# Patient Record
Sex: FEMALE | Race: WHITE | NOT HISPANIC OR LATINO | Employment: OTHER
[De-identification: names, ages, dates, MRNs, and addresses within clinical notes are randomized per-mention and may not be internally consistent; named-entity substitution may affect disease eponyms.]

---

## 2017-09-17 ENCOUNTER — HEALTH MAINTENANCE LETTER (OUTPATIENT)
Age: 57
End: 2017-09-17

## 2017-12-28 ENCOUNTER — TELEPHONE (OUTPATIENT)
Dept: ONCOLOGY | Facility: CLINIC | Age: 57
End: 2017-12-28

## 2017-12-28 NOTE — TELEPHONE ENCOUNTER
"12/28/2017    Referring Provider: Fabienne Monge MD    Presenting Information:  Cindy Wilson returned my phone call today to discuss her updated genetic testing results. Her blood was originally drawn on 12/18/2015. The CancerNext panel was ordered  from [employer]. This testing was done because of Cindy's personal history of melanoma and family history of colon polyps, breast, ovarian, and pancreatic cancer.     At that time, Cindy was found to have two variants of unknown significance (VUS), one in the APC gene and another in the MUTYH gene. The APC variant is called p.W3266B (also known as c.7717A>G). The MUTYH variant is called p.V234M (also known as c.700G>A). No mutations were found in any of the other tested genes: TODD, BARD1, BMPR1A, BRCA1, BRCA2, BRIP1, CDH1, CDK4, CDKN2A, CHEK2, MLH1, MRE11A, MSH2, MSH6, NBN, NF1, PALB2, PMS2, POLD1, POLE, PTEN, RAD50, RAD51C, RAD51D, SMAD4, SMARCA4, STK11, and TP53.    Updated Result:  Recently [employer] contacted me with a new report. The variant in the APC gene has been reclassified as \"variant, likely benign\".  This means that the APC variant found in Cindy is most likely NOT associated with Familial Adenomatous Polyposis. Cindy should continue with cancer screening based on her personal medical and family history, as previously discussed.     Of note, the MUTYH variant has not yet been reclassified and remains a \"variant of uncertain significance\".    Family History Update:  Cindy shared that her brother with over 100 colon polyps did undergo genetic counseling and genetic testing; he reportedly carries the same APC and MUTYH variants identified in Cindy. I would encourage Cindy's brother to stay in contact with his own genetic counselor, as additional genetic testing for colon polyps may be available in the future.    Cindy also shared that her younger brother was recently diagnosed with what may be prostate cancer; he is not being treated at this time. We " briefly discussed that there are genetic causes of prostate cancer that also cause pancreatic, breast, and ovarian cancer. It is possible that his own cancer history has a separate hereditary explanation from Cindy's history. As such, he is encouraged to discuss with his oncologist the appropriateness of meeting with a genetic counselor to discuss his own genetic testing.     Additional Genetic Testing Options:  We discussed that there is now genetic testing available for a few more genes associated with hereditary melanoma: BAP1, RB1, and MITF. Given her personal and family history of melanoma, Cindy could consider being seen again to discuss this additional testing. Though her personal and family history is not necessarily typical for these genes (i.e. RB1 mutations and retinoblastoma, BAP1 and kidney cancer, mesothelioma), this testing may help inform cancer screening for Cindy and her family members. Cindy explained that she will discuss this testing with her , but will likely be more interested in contacting me in one year to readdress her testing options.    Cindy did have several questions regarding genetic testing for her sons. We reviewed that Cindy's original genetic testing was comprehensive and did not identify any harmful gene mutations. As such, she did not pass on an identifiable harmful mutation in these 32 genes to her children. This means genetic testing is not indicated for them, unless otherwise suggested by their paternal family history. Due to the family history of melanoma, though, her children remain at increased risk for developing melanoma.  According to the American Cancer Society, they are encouraged to speak to their primary care provider about having regular skin exams and safe skin practices (i.e. sunscreen, self skin exams, limited sun exposure, etc.). Cindy confirmed that they are already having regular dermatologic exams. She denied having any other questions at this  time.    Plan:   1) I will send Cindy a copy of the new test report that reflects the change in classification.  2) Cindy will be contacted if the laboratory has any additional information regarding the MUTYH variant in the future.  2) If there are any further questions or concerns, she should not hesitate to contact me at [phone].    Cinthia Sarmiento MS, [facility] [place]  Certified Genetic Counselor  Office: [phone]  Pager: [phone]

## 2017-12-28 NOTE — TELEPHONE ENCOUNTER
12/28/2017    I called Cindy today to discuss the reclassification of her APC variant of uncertain significance, but was unable to reach her. I left a non-detailed voicemail with my name and phone number.    Cinthia Sarmiento MS, [facility] [place]  Certified Genetic Counselor  Office: [phone]  Pager: [phone]

## 2017-12-28 NOTE — Clinical Note
"Please print and send a copy of this letter and the patient's genetic testing report to the patient.    Please enclose test report: \"Send outs misc test [[id number]] (Order [id number])\"  "

## 2017-12-28 NOTE — LETTER
"    Cancer Risk Management  Program [facility]  [facility]  Mailing Address  Cancer Risk Management Program  [facility]  [address]  [place] [address]    New patient appointments  [phone]  January 9, 2018    Cindy Wilson  [address] [address]      Dear Cindy,    It was a pleasure speaking with you over the phone recently regarding your updated genetic testing results. Here is a copy of the progress note summarizing our conversation. If you have any additional questions, please feel free to call.    12/28/2017    Referring Provider: Fabienne Monge MD    Presenting Information:  Cindy Wilson returned my phone call today to discuss her updated genetic testing results. Her blood was originally drawn on 12/18/2015. The CancerNext panel was ordered  from [employer]. This testing was done because of Cindy's personal history of melanoma and family history of colon polyps, breast, ovarian, and pancreatic cancer.     At that time, Cindy was found to have two variants of unknown significance (VUS), one in the APC gene and another in the MUTYH gene. The APC variant is called p.U4999W (also known as c.7717A>G). The MUTYH variant is called p.V234M (also known as c.700G>A). No mutations were found in any of the other tested genes: TODD, BARD1, BMPR1A, BRCA1, BRCA2, BRIP1, CDH1, CDK4, CDKN2A, CHEK2, MLH1, MRE11A, MSH2, MSH6, NBN, NF1, PALB2, PMS2, POLD1, POLE, PTEN, RAD50, RAD51C, RAD51D, SMAD4, SMARCA4, STK11, and TP53.    Updated Result:  Recently [facility] contacted me with a new report. The variant in the APC gene has been reclassified as \"variant, likely benign\".  This means that the APC variant found in Cindy is most likely NOT associated with Familial Adenomatous Polyposis. Cindy should " "continue with cancer screening based on her personal medical and family history, as previously discussed.     Of note, the MUTYH variant has not yet been reclassified and remains a \"variant of uncertain significance\".    Family History Update:  Cindy shared that her brother with over 100 colon polyps did undergo genetic counseling and genetic testing; he reportedly carries the same APC and MUTYH variants identified in Cindy. I would encourage Cindy's brother to stay in contact with his own genetic counselor, as additional genetic testing for colon polyps may be available in the future.    Cindy also shared that her younger brother was recently diagnosed with what may be prostate cancer; he is not being treated at this time. We briefly discussed that there are genetic causes of prostate cancer that also cause pancreatic, breast, and ovarian cancer. It is possible that his own cancer history has a separate hereditary explanation from Cindy's history. As such, he is encouraged to discuss with his oncologist the appropriateness of meeting with a genetic counselor to discuss his own genetic testing.     Additional Genetic Testing Options:  We discussed that there is now genetic testing available for a few more genes associated with hereditary melanoma: BAP1, RB1, and MITF. Given her personal and family history of melanoma, Cindy could consider being seen again to discuss this additional testing. Though her personal and family history is not necessarily typical for these genes (i.e. RB1 mutations and retinoblastoma, BAP1 and kidney cancer, mesothelioma), this testing may help inform cancer screening for Cindy and her family members. Cindy explained that she will discuss this testing with her , but will likely be more interested in contacting me in one year to readdress her testing options.    Cindy did have several questions regarding genetic testing for her sons. We reviewed that Cindy's original genetic " testing was comprehensive and did not identify any harmful gene mutations. As such, she did not pass on an identifiable harmful mutation in these 32 genes to her children. This means genetic testing is not indicated for them, unless otherwise suggested by their paternal family history. Due to the family history of melanoma, though, her children remain at increased risk for developing melanoma.  According to the American Cancer Society, they are encouraged to speak to their primary care provider about having regular skin exams and safe skin practices (i.e. sunscreen, self skin exams, limited sun exposure, etc.). Cindy confirmed that they are already having regular dermatologic exams. She denied having any other questions at this time.    Plan:   1) I will send Cindy a copy of the new test report that reflects the change in classification.  2) Cindy will be contacted if the laboratory has any additional information regarding the MUTYH variant in the future.  2) If there are any further questions or concerns, she should not hesitate to contact me at [phone].    Cinthia Sarmiento MS, [facility] [place]  Certified Genetic Counselor  Office: [phone]  Pager: [phone]

## 2019-11-08 ENCOUNTER — HEALTH MAINTENANCE LETTER (OUTPATIENT)
Age: 59
End: 2019-11-08

## 2020-02-23 ENCOUNTER — HEALTH MAINTENANCE LETTER (OUTPATIENT)
Age: 60
End: 2020-02-23

## 2020-02-28 DIAGNOSIS — R19.7 DIARRHEA OF PRESUMED INFECTIOUS ORIGIN: Primary | ICD-10-CM

## 2020-02-28 RX ORDER — AZITHROMYCIN 500 MG/1
500 TABLET, FILM COATED ORAL DAILY
Qty: 6 TABLET | Refills: 1 | Status: SHIPPED | OUTPATIENT
Start: 2020-02-28 | End: 2022-01-18

## 2020-12-06 ENCOUNTER — HEALTH MAINTENANCE LETTER (OUTPATIENT)
Age: 60
End: 2020-12-06

## 2021-02-25 ENCOUNTER — TELEPHONE (OUTPATIENT)
Dept: ONCOLOGY | Facility: CLINIC | Age: 61
End: 2021-02-25

## 2021-02-25 DIAGNOSIS — Z15.89 MONOALLELIC MUTATION OF MUTYH GENE: ICD-10-CM

## 2021-02-25 NOTE — TELEPHONE ENCOUNTER
"2/25/2021    Referring Provider: Fabienne Monge MD    Presenting Information:  I spoke to Cindy Wilson by phone today to discuss an update to her genetic testing results. She previously met with Carmen Vazquez MS, [facility] [place] on 12/16/2015 to discuss her personal and family history of multiple cancers. Her blood was drawn for the CancerNext panel, offered by [employer].     At that time, Cindy was found to have two variants of unknown significance (VUS), one in the APC gene (p.W8728N) and another in the MUTYH gene (p.V234M). Of note, I previously contacted Cindy on 12/28/2017 regarding the reclassification of the APC variant as \"likely benign\". No mutations were found in any of the other tested genes: TODD, BARD1, BMPR1A, BRCA1, BRCA2, BRIP1, CDH1, CDK4, CDKN2A, CHEK2, MLH1, MRE11A, MSH2, MSH6, NBN, NF1, PALB2, PMS2, POLD1, POLE, PTEN, RAD50, RAD51C, RAD51D, SMAD4, SMARCA4, STK11, and TP53.    Result Reclassification: POSITIVE - CARRIER  Recently [facility] contacted me with a new report. The VUS in the MUTYH gene has been reclassified as \"variant, likely pathogenic\". 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    MUTYH Cancer Risks:   We reviewed that having only one MUTYH mutation means that Cindy is a carrier for MUTYH-Associated Polyposis (MAP). Approximately 1 in 100 (1%) individuals in the general population are carriers of MAP.      Carriers of MAP have a very slightly increased risk of colon cancer (which may be as high as twice the population risk of 4-5%).     Several studies have also suggested that women who carry one MUTYH mutation may be at some increased risk for breast cancer, though additional research is needed to clarify this potential risk.    We discussed that in comparison, individuals with MAP (who carry two MUTYH mutations) can have hundreds to thousands of polyps, but most have fewer than 100. " Individuals with MAP have up to an 80% risk of developing colon cancer by age 70 if not treated. MAP is also associated with an increased risk of duodenal cancer.      Cancer Screening and Prevention:  The following screening is recommended for individuals who have one mutation in the MUTYH gene, per current National Comprehensive Cancer Network (NCCN) guidelines:    Individuals who have a first degree relative (parent, child, sibling) with colon cancer should consider colonoscopy every five years starting at 40 (or 10 years before their relative's age at diagnosis, whichever is first).    It is uncertain if a specialized screening plan is necessary for those individuals who do not have a close family history of colon cancer like Cindy, though. We discussed the importance of keeping in contact with me to determine if guidelines have changed, as this may change how we approach colon screening for her in the future.     There are currently no other specific screening recommendations for other cancers potentially associated with one MUTYH mutation.    In comparison, individuals with MAP are recommended to follow specific colonoscopy and upper endoscopy recommendations, beginning by age 25-30.    Other screening based on family history:    Cindy should continue to follow all dermatologic screening recommendations as made by her medical providers, given her personal history of melanoma.    As previously discussed with Cindy, her family history of a sister and grandmother with pancreatic cancer suggests that she could consider pancreatic cancer screening. This screening typically involves endoscopic ultrasonography (EUS) and/or MRI/magnetic resonance cholangiopancreatography. Cindy explained that she has previously met with a GI specialist to review this screening in more detail.    Cindy clarified that she gathered additional information regarding her brother's colon polyp history, which suggests he did not have as  many polyps as she previously thought. As such, Cindy should continue to follow all colonoscopy recommendations as made by her GI providers based on her updated personal and family history.    Due to Cindy's history of melanoma, her children and siblings remain at some increased risk for developing melanoma. According to the American Cancer Society, they are encouraged to speak to their primary care providers about having regular skin exams and safe skin practices (i.e. sunscreen, self skin exams, limited sun exposure, etc.).    Other population cancer screening options, such as those recommended by the American Cancer Society and NCCN, are also appropriate for Cindy and her family. These screening recommendations may change if there are changes to Cindy's personal and/or family history of cancer. Final screening recommendations should be made by each individual's managing physician.    Of note, the above information is based on our current understanding of Cinyd's genetic findings. Cindy is encouraged to reach out to me regularly and with any pertinent updates to her personal and/or family history of cancer, as our understanding of the genetic findings in her family may change over time.     Implications for Family Members:  We reviewed the autosomal recessive inheritance of MAP. Individuals with MAP have a mutation within both copies of the MUTYH gene (two mutations, one that was inherited from each parent). When both parents are carriers for MAP, they have a 25% chance of having a child who inherited two mutations (affected with MAP), a 50% chance of having a child who inherited one mutation (carrier of MAP), and a 25% chance of having a child who inherited neither mutation (unaffected; not a carrier). These chances apply to each pregnancy.     We discussed that testing for MUTYH mutations in Cindy s  is available, which would be helpful in determining risks for their children. As Cindy's children  are over the age of 18, though, they could consider pursuing their own genetic testing of the MUTYH gene instead. This testing should include full gene sequencing and deletion/duplication analysis of the MUTYH gene to best clarify their risks.     Genetic counseling is also recommended for Cindy s siblings, as they have at least a 50% chance to carry the same MUTYH mutation identified in Cindy. It will likely be recommended that they consider comprehensive testing of the MUTYH gene, though, as it is possible that they could have MAP or carry a mutation in the MUTYH gene that is different than the one identified in Cindy. Other extended relatives may also carry this mutation and Cindy is encouraged to share this information with individuals on both sides of her family. I would be happy to see Cindy s family members for testing.     Additional Testing Considerations:  We then reviewed that though Cindy was found to carry this one MUTYH mutation, there still remains a possibility that she carries another gene mutation or combination of genes and environment that increase her risk for certain cancers. We reviewed the option of updated gene panels covering more genes associated with the cancers in her family, such as the BAP1 gene (melanoma) and the AXIN2, MSH3, NTHL1 genes (colon cancer and polyps). Cindy is encouraged to contact me if she wishes to readdress these new gene panel options.    Also, even though Cindy's genetic testing result was positive for this one mutation, other relatives may carry a different gene mutation associated with hereditary cancer. If her relatives are interested in pursuing genetic testing, they are encouraged to meet with a genetic counselor to discuss their multi-gene testing options. If any of these relatives do pursue genetic testing, Cindy is encouraged to contact me so that we may review the impact of their test results on her.    Plan:  1.  Cindy will be mailed a copy of  "her test results.  2.  I will provide a \"Dear Relative\" letter for Cindy to share with her family members. This will be provided to Cindy through [employer].  3.  She plans to follow up with her medical providers, as needed.  4.  Cindy is encouraged to contact me if she has any questions, if there are any updates to her personal/family history, and if she wishes to readdress her updated genetic testing options.    If Cindy has additional questions, I encouraged her to contact me directly at [phone].     Cinthia Sarmiento MS, [facility]  Licensed Genetic Counselor  Office: [phone]  Pager: [phone]  "

## 2021-03-11 PROBLEM — Z15.89 MONOALLELIC MUTATION OF MUTYH GENE: Status: ACTIVE | Noted: 2021-03-11

## 2021-03-12 NOTE — PATIENT INSTRUCTIONS
2/25/2021    Dear Relative:  The purpose of this letter is to inform you that your relative underwent genetic counseling and genetic testing due to the family history of cancer.    The testing done through [employer] identified one mutation in the MUTYH gene. Specifically, the mutation is called p.V234M (also known as c.700G>A). The accession number linked to your relative's test report is [id number].  Mutations in the MUTYH gene cause a condition called MAP (MUTYH-Associated Polyposis). Carrying just one mutation in MUTYH means you are a carrier of MAP, and have a slightly increased risk for colon cancer. Carrying two mutations in the MUTYH gene means you have MAP.    Individuals with MAP can have hundreds to thousands of colon polyps, although most have fewer than 100 adenomatous polyps. Individuals with MAP have up to an 80% risk of developing colon cancer, as well as an increased risk for duodenal cancer. Screening guidelines are available to help manage these cancer risks.  Carriers of MAP have a very slight increased risk of colon cancer. Of note, approximately 1 in 100 (1%) individuals in the general population are carriers of MAP.  The risk to pass a MUTYH mutation on to children and/or to have a child with MAP depends on if a parent has one or two MUTYH mutation(s). The risks are also different if one or both parents are carriers, not carriers, or have MAP themselves.  I understand that this may be surprising, unexpected, and even scary news. Because this mutation has been identified in your family, you are at risk for having this same mutation (being a carrier for MAP), or having two mutations (having MAP due to this mutation and another mutation). As mentioned earlier, your children may also be at risk.    Genetic testing for mutations in the MUTYH gene can be done to determine your risk. Depending on how you are related to other individuals in the family who have already undergone genetic testing,  more targeted versus comprehensive testing may be appropriate.     Scheduling a genetic counseling appointment does not mean you have to undergo genetic testing. The decision to pursue such testing is a very personal one that is discussed in more detail during the session.  Much of cancer genetic counseling is providing valuable information to individuals who are impacted by genetic information such as this.    If you are interested in scheduling a genetic counseling appointment at [facility], please call [phone] to schedule an appointment. You can also find a genetic counselor close to you or at another health system at [facility]  Sincerely,   Cinthia Sarmiento MS, [facility]  Licensed Genetic Counselor  Office: [phone]  [email or web address]

## 2021-03-17 ENCOUNTER — IMMUNIZATION (OUTPATIENT)
Dept: NURSING | Facility: CLINIC | Age: 61
End: 2021-03-17
Payer: COMMERCIAL

## 2021-03-17 PROCEDURE — 0001A PR COVID VAC PFIZER DIL RECON 30 MCG/0.3 ML IM: CPT

## 2021-03-17 PROCEDURE — 91300 PR COVID VAC PFIZER DIL RECON 30 MCG/0.3 ML IM: CPT

## 2021-04-01 ENCOUNTER — TELEPHONE (OUTPATIENT)
Dept: ONCOLOGY | Facility: CLINIC | Age: 61
End: 2021-04-01

## 2021-04-01 NOTE — TELEPHONE ENCOUNTER
4/1/2021    I called Cindy today, as I was forwarded a message sent by Cindy requesting her updated genetic testing results. Cindy explained that she recalled speaking to me about the results on 4/1/2021, but she never received a copy of the letter explaining the updated results.    I apologized for the inconvenience and explained that she should have received it through the mail. I offered to send her another copy of the letter by e-mail, mail, [employer], etc. She requested that the letter be sent to her by e-mail and she confirmed that the e-mail address in her chart is correct. She also verbalized comfort with the level of security associated with e-mail.    She thanked me for my time and denied having any other questions at this time.    Cinthia Sarmiento MS, [facility]  Licensed Genetic Counselor  Office: [phone]  Pager: [phone]

## 2021-04-07 ENCOUNTER — IMMUNIZATION (OUTPATIENT)
Dept: NURSING | Facility: CLINIC | Age: 61
End: 2021-04-07
Attending: INTERNAL MEDICINE
Payer: COMMERCIAL

## 2021-04-07 PROCEDURE — 91300 PR COVID VAC PFIZER DIL RECON 30 MCG/0.3 ML IM: CPT

## 2021-04-07 PROCEDURE — 0002A PR COVID VAC PFIZER DIL RECON 30 MCG/0.3 ML IM: CPT

## 2021-08-11 DIAGNOSIS — N30.00 ACUTE CYSTITIS WITHOUT HEMATURIA: Primary | ICD-10-CM

## 2021-08-11 RX ORDER — SULFAMETHOXAZOLE/TRIMETHOPRIM 800-160 MG
1 TABLET ORAL 2 TIMES DAILY
Qty: 6 TABLET | Refills: 0 | Status: SHIPPED | OUTPATIENT
Start: 2021-08-11 | End: 2021-08-14

## 2021-09-25 ENCOUNTER — HEALTH MAINTENANCE LETTER (OUTPATIENT)
Age: 61
End: 2021-09-25

## 2021-11-20 ENCOUNTER — HEALTH MAINTENANCE LETTER (OUTPATIENT)
Age: 61
End: 2021-11-20

## 2022-01-03 ENCOUNTER — TRANSCRIBE ORDERS (OUTPATIENT)
Dept: OTHER | Age: 62
End: 2022-01-03
Payer: COMMERCIAL

## 2022-01-03 ENCOUNTER — PATIENT OUTREACH (OUTPATIENT)
Dept: ONCOLOGY | Facility: CLINIC | Age: 62
End: 2022-01-03
Payer: COMMERCIAL

## 2022-01-03 DIAGNOSIS — Z80.0 FAMILY HISTORY OF PANCREATIC CANCER: Primary | ICD-10-CM

## 2022-01-03 NOTE — PROGRESS NOTES
[employer] referral    Referred by: Self    Process discussed with patient: No, unable to reach by phone    Patient understands the [employer] fee is out of pocket: Not at this time, will defer to scheduling on updating her as I am unable to reach her.    Patient willing to proceed with scheduling: Pending

## 2022-01-15 ENCOUNTER — HEALTH MAINTENANCE LETTER (OUTPATIENT)
Age: 62
End: 2022-01-15

## 2022-01-18 ENCOUNTER — ONCOLOGY VISIT (OUTPATIENT)
Dept: ONCOLOGY | Facility: CLINIC | Age: 62
End: 2022-01-18
Attending: CLINICAL NURSE SPECIALIST
Payer: COMMERCIAL

## 2022-01-18 ENCOUNTER — LAB (OUTPATIENT)
Dept: LAB | Facility: CLINIC | Age: 62
End: 2022-01-18
Attending: CLINICAL NURSE SPECIALIST
Payer: COMMERCIAL

## 2022-01-18 VITALS
BODY MASS INDEX: 20.72 KG/M2 | OXYGEN SATURATION: 97 % | DIASTOLIC BLOOD PRESSURE: 66 MMHG | HEART RATE: 66 BPM | HEIGHT: 67 IN | WEIGHT: 132 LBS | TEMPERATURE: 98.5 F | SYSTOLIC BLOOD PRESSURE: 97 MMHG

## 2022-01-18 DIAGNOSIS — Z15.89 MONOALLELIC MUTATION OF MUTYH GENE: ICD-10-CM

## 2022-01-18 DIAGNOSIS — Z85.820 HISTORY OF MELANOMA: Primary | ICD-10-CM

## 2022-01-18 DIAGNOSIS — Z80.41 FAMILY HISTORY OF MALIGNANT NEOPLASM OF OVARY: ICD-10-CM

## 2022-01-18 DIAGNOSIS — Z85.820 HISTORY OF MELANOMA: ICD-10-CM

## 2022-01-18 DIAGNOSIS — Z80.0 FAMILY HISTORY OF PANCREATIC CANCER: ICD-10-CM

## 2022-01-18 PROBLEM — B01.9 VARICELLA: Status: ACTIVE | Noted: 2022-01-18

## 2022-01-18 PROBLEM — J30.1 SEASONAL ALLERGIC RHINITIS DUE TO POLLEN: Status: ACTIVE | Noted: 2019-09-10

## 2022-01-18 PROBLEM — D12.6 SERRATED ADENOMA OF COLON: Status: ACTIVE | Noted: 2021-05-19

## 2022-01-18 PROBLEM — J45.20 MILD INTERMITTENT ASTHMA WITHOUT COMPLICATION: Status: ACTIVE | Noted: 2019-09-10

## 2022-01-18 PROCEDURE — G0463 HOSPITAL OUTPT CLINIC VISIT: HCPCS

## 2022-01-18 PROCEDURE — 99000 SPECIMEN HANDLING OFFICE-LAB: CPT | Performed by: PATHOLOGY

## 2022-01-18 PROCEDURE — 86849 IMMUNOLOGY PROCEDURE: CPT | Mod: 90 | Performed by: PATHOLOGY

## 2022-01-18 PROCEDURE — 99215 OFFICE O/P EST HI 40 MIN: CPT | Performed by: CLINICAL NURSE SPECIALIST

## 2022-01-18 RX ORDER — KETOCONAZOLE 20 MG/ML
SHAMPOO TOPICAL
COMMUNITY
Start: 2021-07-16

## 2022-01-18 RX ORDER — SERTRALINE HYDROCHLORIDE 25 MG/1
TABLET, FILM COATED ORAL
COMMUNITY
Start: 2022-01-15

## 2022-01-18 ASSESSMENT — PAIN SCALES - GENERAL: PAINLEVEL: NO PAIN (0)

## 2022-01-18 ASSESSMENT — MIFFLIN-ST. JEOR: SCORE: 1183.99

## 2022-01-18 NOTE — PROGRESS NOTES
Oncology Risk Management Consultation:  Date on this visit: 2022    Cindy Wilson is self referred to discuss the risks and benefits of the Galleri test to detect cell free DNA from cancer tumors.     Primary Physician: Fabienne Monge MD    History Of Present Illness:  Ms. Wilson is a 62 year old female who presents for a discussion of the risks and benefits of the Galleri test.  She has a family history of ovarian, pancreatic, esophageal and other cancers, as well as a personal history of melanoma.    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    MUTYH mutation p.V234M [employer], reclassified 9/15/2020     Past Surgical History:   Procedure Laterality Date     COLONOSCOPY       ENDOSCOPIC ULTRASOUND UPPER GASTROINTESTINAL TRACT (GI) N/A 2016    Procedure: ENDOSCOPIC ULTRASOUND, ESOPHAGOSCOPY / UPPER GASTROINTESTINAL TRACT (GI);  Surgeon: Miguel A Varela MD;  Location: [facility] OR     GYN SURGERY           ORTHOPEDIC SURGERY      right knee scope       Allergies:  Allergies as of 2022 - Reviewed 2022   Allergen Reaction Noted     Codeine Hives 2016       Current Medications:  Current Outpatient Medications   Medication Sig Dispense Refill     ketoconazole (NIZORAL) 2 % external shampoo        loratadine (CLARITIN) 10 MG tablet Take 10 mg by mouth daily       multivitamin, therapeutic with minerals (THERA-VIT-M) TABS Take 1 tablet by mouth daily       sertraline (ZOLOFT) 25 MG tablet        sertraline (ZOLOFT) 50 MG tablet TAKE 1 TABLET BY MOUTH DAILY. TAKE IN ADDITION TO THE 25 MG DOSE FOR TOTAL DAILY DOSE OF 75 MG PER DAY. GENERIC EQUIVALENT FOR ZOLOFT       VITAMIN D, CHOLECALCIFEROL, PO Take 2,000 Units by mouth daily           Family History:  Family History   Problem Relation Age of Onset     Breast Cancer Mother 76         at 79     Bladder Cancer Father 65      "Pancreatic Cancer Sister 54         at 56; history of pancreatitis     Colon Polyps Brother         more than 100     Pancreatic Cancer Maternal Grandmother 69         at 70     Lung Cancer Maternal Grandfather 70     Esophageal Cancer Paternal Grandmother 95         at 99     Pancreatic Cancer Maternal Cousin 76        possibly other risk factors     Melanoma Paternal Uncle 80     Ovarian Cancer Maternal Aunt 86       Social History:  Social History     Socioeconomic History     Marital status:      Spouse name: Not on file     Number of children: Not on file     Years of education: Not on file     Highest education level: Not on file   Occupational History     Not on file   Tobacco Use     Smoking status: Never Smoker     Smokeless tobacco: Never Used   Substance and Sexual Activity     Alcohol use: Yes     Comment: 1/week     Drug use: No     Sexual activity: Not on file   Other Topics Concern     Not on file   Social History Narrative     Not on file     Social Determinants of Health     Financial Resource Strain: Not on file   Food Insecurity: Not on file   Transportation Needs: Not on file   Physical Activity: Not on file   Stress: Not on file   Social Connections: Not on file   Intimate Partner Violence: Not on file   Housing Stability: Not on file     Physical Exam:  BP 97/66   Pulse 66   Temp 98.5  F (36.9  C) (Oral)   Ht 1.69 m (5' 6.54\")   Wt 59.9 kg (132 lb)   SpO2 97%   BMI 20.96 kg/m    GENERAL: Healthy, alert and no distress  EYES: Eyes grossly normal to inspection.  No discharge or erythema, or obvious scleral/conjunctival abnormalities.  RESP: No audible wheeze, cough, or visible cyanosis.  No visible retractions or increased work of breathing.    SKIN: Visible skin clear. No significant rash, abnormal pigmentation or lesions.  NEURO: Cranial nerves grossly intact.  Mentation and speech appropriate for age.  PSYCH: Mentation appears normal, affect normal/bright, judgement " and insight intact, normal speech and appearance well-groomed.    Laboratory/Imaging Studies  No results found for any visits on 01/18/22.    PROCEDURE NOTE:  The risks and benefits of the procedure were described to the patient.  We discussed that the Galleri test can be included in a routine visit with a simple blood draw. Although not yet FDA approved, the Galleri test is recommended for use in adults with an elevated risk for cancer, such as those aged 50 or older. It is intended to be used in addition to and not replace other cancer screening tests your healthcare provider recommends. The test works by detection of DNA methylation patterns using cell-free DNA (cfDNA) isolated from whole blood. DNA methylation is a natural process used by cells to regulate gene expression. Certain DNA methylation patterns can serve as a signal of cancer and provide information about the origin of the cancer signal.    Not all cancers can be detected by the Galleri test. The Galleri test detects circulating tumor-derived, cell-free DNA (cfDNA) that could indicate the presence of cancer.     The test detects more than 50 types of cancer, many of which do not have routine screening tests.  It has a low single false-positive rate of 0.5%. but it does have higher false negative rates, and the rate varies for different types of cancer.  The Galleri test does not detect all cancers and should be used in addition to guideline-recommended cancer screenings such as mammography, colonoscopy, PSA, or cervical cancer screening.    There are two possible results from the Galleri test:  1. A test result of  Cancer Signal Not Detected  does not rule out cancer.   2. A test result of  Cancer Signal Detected  requires confirmatory diagnostic evaluation by medically established procedures (e.g. imaging) to confirm cancer.     If cancer is not confirmed with further testing, it could mean that cancer is not present or testing was insufficient to  detect cancer, including due to the cancer being located in a different part of the body.      The Galleri test is not intended to replace guideline-recommended cancer screening tests. One of the most pressing unmet needs in early cancer detection is to identify cancers for which there are no existing recommended screening tests. Galleri is designed to detect many cancer types, including those that lack routine screenings.     Using the Galleri test alongside existing cancer screening programs, such as mammograms and colonoscopies, is expected to increase early cancer detection. In addition to detecting many cancers that lack recommended screenings, the Galleri test may also find cancers missed during routine screening, cancers in individuals who were non-adherent to current cancer screening tests, cancers that occurred during screening intervals, or cancers that occurred in individuals who lack access to existing screening tests.    At this visit, we discussed the risks and benefits of Galleri testing and Cindy signed consent for testing.     Cindy's blood was drawn, per Galleri protocol and the test was sent to the [facility] lab for processing.  I will see Cindy in 2 weeks via video visit to go over results.           I spent a total of 44 minutes on the day of the visit. Please see the note for further information on patient assessment and treatment.    Kasandra Clement, YSABEL-CNS, OCN, AGN-BC  Clinical Nurse Specialist  Cancer Risk Management Program  [facility]  phone:  [phone]  fax: [phone]    CC: Fabienne Monge MD

## 2022-01-18 NOTE — NURSING NOTE
"Oncology Rooming Note    January 18, 2022 3:00 PM   Cindy Wilson is a 62 year old female who presents for:    Chief Complaint   Patient presents with     Oncology Clinic Visit     family history of pancreatic cancer     Initial Vitals: BP 97/66   Pulse 66   Temp 98.5  F (36.9  C) (Oral)   Ht 1.69 m (5' 6.54\")   Wt 59.9 kg (132 lb)   SpO2 97%   BMI 20.96 kg/m   Estimated body mass index is 20.96 kg/m  as calculated from the following:    Height as of this encounter: 1.69 m (5' 6.54\").    Weight as of this encounter: 59.9 kg (132 lb). Body surface area is 1.68 meters squared.  No Pain (0) Comment: Data Unavailable   No LMP recorded. Patient is postmenopausal.  Allergies reviewed: Yes  Medications reviewed: Yes    Medications: Medication refills not needed today.  Pharmacy name entered into [employer]:    [id number] [place] [address] [facility] [id number] [place] [address]    Clinical concerns: none       Tatyana Garcia CMA            "

## 2022-01-18 NOTE — LETTER
2022         RE: Cindy Wilson  [address] [facility] [address]        Dear Colleague,    Thank you for referring your patient, Cindy Wilson, to the [facility]. Please see a copy of my visit note below.    Oncology Risk Management Consultation:  Date on this visit: 2022    Cindy Wilson is self referred to discuss the risks and benefits of the Galleri test to detect cell free DNA from cancer tumors.     Primary Physician: Fabienne Monge MD    History Of Present Illness:  Ms. Wilson is a 62 year old female who presents for a discussion of the risks and benefits of the Galleri test.  She has a family history of ovarian, pancreatic, esophageal and other cancers, as well as a personal history of melanoma.    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    MUTYH mutation p.V234M [employer], reclassified 9/15/2020     Past Surgical History:   Procedure Laterality Date     COLONOSCOPY       ENDOSCOPIC ULTRASOUND UPPER GASTROINTESTINAL TRACT (GI) N/A 2016    Procedure: ENDOSCOPIC ULTRASOUND, ESOPHAGOSCOPY / UPPER GASTROINTESTINAL TRACT (GI);  Surgeon: Miguel A Vaerla MD;  Location: [facility] OR     GYN SURGERY           ORTHOPEDIC SURGERY      right knee scope       Allergies:  Allergies as of 2022 - Reviewed 2022   Allergen Reaction Noted     Codeine Hives 2016       Current Medications:  Current Outpatient Medications   Medication Sig Dispense Refill     ketoconazole (NIZORAL) 2 % external shampoo        loratadine (CLARITIN) 10 MG tablet Take 10 mg by mouth daily       multivitamin, therapeutic with minerals (THERA-VIT-M) TABS Take 1 tablet by mouth daily       sertraline (ZOLOFT) 25 MG tablet        sertraline (ZOLOFT) 50 MG tablet TAKE 1 TABLET BY MOUTH DAILY. TAKE IN ADDITION TO THE 25 MG DOSE FOR TOTAL DAILY DOSE OF 75 MG PER DAY. GENERIC  "EQUIVALENT FOR ZOLOFT       VITAMIN D, CHOLECALCIFEROL, PO Take 2,000 Units by mouth daily           Family History:  Family History   Problem Relation Age of Onset     Breast Cancer Mother 76         at 79     Bladder Cancer Father 65     Pancreatic Cancer Sister 54         at 56; history of pancreatitis     Colon Polyps Brother         more than 100     Pancreatic Cancer Maternal Grandmother 69         at 70     Lung Cancer Maternal Grandfather 70     Esophageal Cancer Paternal Grandmother 95         at 99     Pancreatic Cancer Maternal Cousin 76        possibly other risk factors     Melanoma Paternal Uncle 80     Ovarian Cancer Maternal Aunt 86       Social History:  Social History     Socioeconomic History     Marital status:      Spouse name: Not on file     Number of children: Not on file     Years of education: Not on file     Highest education level: Not on file   Occupational History     Not on file   Tobacco Use     Smoking status: Never Smoker     Smokeless tobacco: Never Used   Substance and Sexual Activity     Alcohol use: Yes     Comment: 1/week     Drug use: No     Sexual activity: Not on file   Other Topics Concern     Not on file   Social History Narrative     Not on file     Social Determinants of Health     Financial Resource Strain: Not on file   Food Insecurity: Not on file   Transportation Needs: Not on file   Physical Activity: Not on file   Stress: Not on file   Social Connections: Not on file   Intimate Partner Violence: Not on file   Housing Stability: Not on file     Physical Exam:  BP 97/66   Pulse 66   Temp 98.5  F (36.9  C) (Oral)   Ht 1.69 m (5' 6.54\")   Wt 59.9 kg (132 lb)   SpO2 97%   BMI 20.96 kg/m    GENERAL: Healthy, alert and no distress  EYES: Eyes grossly normal to inspection.  No discharge or erythema, or obvious scleral/conjunctival abnormalities.  RESP: No audible wheeze, cough, or visible cyanosis.  No visible retractions or increased work of " breathing.    SKIN: Visible skin clear. No significant rash, abnormal pigmentation or lesions.  NEURO: Cranial nerves grossly intact.  Mentation and speech appropriate for age.  PSYCH: Mentation appears normal, affect normal/bright, judgement and insight intact, normal speech and appearance well-groomed.    Laboratory/Imaging Studies  No results found for any visits on 01/18/22.    PROCEDURE NOTE:  The risks and benefits of the procedure were described to the patient.  We discussed that the Galleri test can be included in a routine visit with a simple blood draw. Although not yet FDA approved, the Galleri test is recommended for use in adults with an elevated risk for cancer, such as those aged 50 or older. It is intended to be used in addition to and not replace other cancer screening tests your healthcare provider recommends. The test works by detection of DNA methylation patterns using cell-free DNA (cfDNA) isolated from whole blood. DNA methylation is a natural process used by cells to regulate gene expression. Certain DNA methylation patterns can serve as a signal of cancer and provide information about the origin of the cancer signal.    Not all cancers can be detected by the Galleri test. The Galleri test detects circulating tumor-derived, cell-free DNA (cfDNA) that could indicate the presence of cancer.     The test detects more than 50 types of cancer, many of which do not have routine screening tests.  It has a low single false-positive rate of 0.5%. but it does have higher false negative rates, and the rate varies for different types of cancer.  The Galleri test does not detect all cancers and should be used in addition to guideline-recommended cancer screenings such as mammography, colonoscopy, PSA, or cervical cancer screening.    There are two possible results from the Galleri test:  1. A test result of  Cancer Signal Not Detected  does not rule out cancer.   2. A test result of  Cancer Signal  Detected  requires confirmatory diagnostic evaluation by medically established procedures (e.g. imaging) to confirm cancer.     If cancer is not confirmed with further testing, it could mean that cancer is not present or testing was insufficient to detect cancer, including due to the cancer being located in a different part of the body.      The Galleri test is not intended to replace guideline-recommended cancer screening tests. One of the most pressing unmet needs in early cancer detection is to identify cancers for which there are no existing recommended screening tests. Galleri is designed to detect many cancer types, including those that lack routine screenings.     Using the Galleri test alongside existing cancer screening programs, such as mammograms and colonoscopies, is expected to increase early cancer detection. In addition to detecting many cancers that lack recommended screenings, the Galleri test may also find cancers missed during routine screening, cancers in individuals who were non-adherent to current cancer screening tests, cancers that occurred during screening intervals, or cancers that occurred in individuals who lack access to existing screening tests.    At this visit, we discussed the risks and benefits of Galleri testing and Cindy signed consent for testing.     Cindy's blood was drawn, per Galleri protocol and the test was sent to the [facility] lab for processing.  I will see Cindy in 2 weeks via video visit to go over results.           I spent a total of 44 minutes on the day of the visit. Please see the note for further information on patient assessment and treatment.    YSABEL Carrillo-CNS, OCN, AGN-BC  Clinical Nurse Specialist  Cancer Risk Management Program  [facility]  phone:  [phone]  fax: [phone]    CC: Fabienne Monge MD

## 2022-02-04 ENCOUNTER — VIRTUAL VISIT (OUTPATIENT)
Dept: ONCOLOGY | Facility: CLINIC | Age: 62
End: 2022-02-04
Attending: CLINICAL NURSE SPECIALIST
Payer: COMMERCIAL

## 2022-02-04 DIAGNOSIS — Z01.89 ENCOUNTER FOR LABORATORY TEST: Primary | ICD-10-CM

## 2022-02-04 PROBLEM — B01.9 VARICELLA: Status: RESOLVED | Noted: 2022-01-18 | Resolved: 2022-02-04

## 2022-02-04 PROCEDURE — 99214 OFFICE O/P EST MOD 30 MIN: CPT | Mod: 95 | Performed by: CLINICAL NURSE SPECIALIST

## 2022-02-04 NOTE — PROGRESS NOTES
Cindy is a 62 year old who is being evaluated via a billable video visit.      How would you like to obtain your AVS? ShoutOut  If the video visit is dropped, the invitation should be resent by: [phone] via text  Will anyone else be joining your video visit? Rita Agosto    Video Start Time: 1215  Video-Visit Details    Type of service:  Video Visit    Video End Time:1240    Originating Location (pt. Location): Home    Distant Location (provider location):  [facility]     Platform used for Video Visit: Lilli White is a 62 year old who is being evaluated via a billable video visit.      How would you like to obtain your AVS? Mail a copy    Oncology Risk Management Consultation:  Date on this visit: 2022    Cindy Wilson is participating in a billable video visit today to discuss her Galleri test results She is here today with her , Ed.     Primary Physician: Fabienne Monge MD    History Of Present Illness:  Ms. Wilson is a very pleasant, healthy 62 year old female who presents to discuss the results of her Galleri test.  She has a family history of ovarian, pancreatic, esophageal and other cancers, as well as a personal history of melanoma.    Genetic testin2015. Cancer Next panel from [facility]. (This testing was done because of Cindy's personal history of melanoma and family history of colon polyps, breast, ovarian, and pancreatic cancer.)  Cindy was found to be negative for pathogenic mutations in all genes tested but was found to have two variants of unknown significance (VUS), one in the APC gene and another in the MUTYH gene. The APC variant is called p.E8553Y (also known as c.7717A>G). The MUTYH variant is called p.V234M (also known as c.700G>A).     No mutations were found in any of the other tested genes: TODD, BARD1, BMPR1A, BRCA1, BRCA2, BRIP1, CDH1, CDK4, CDKN2A, CHEK2, MLH1, MRE11A, MSH2, MSH6, NBN, NF1, PALB2, PMS2, POLD1, POLE,  "PTEN, RAD50, RAD51C, RAD51D, SMAD4, SMARCA4, STK11, and TP53.    2017- Updated Result: APC Gene:  The variant in the APC gene was reclassified as \"variant, likely benign\".  This means that the APC variant found in Cindy is most likely NOT associated with Familial Adenomatous Polyposis.     2021- Updated Result: MUTYH Gene  The testing done through [facility] in  was upgraded to note that the previously identified \"variant of unknown significance\" is now reclassified to \"variant, likely pathogenic.\"  The variant identified one mutation in the MUTYH gene. Specifically, the mutation is called p.V234M (also known as c.700G>A).   Mutations in the MUTYH gene cause a condition called MAP (MUTYH-Associated Polyposis). Carrying just one mutation in MUTYH means you are a carrier of MAP, and have a slightly increased risk for colon cancer. Carrying two mutations in the MUTYH gene means you have MAP.  Cindy is a carrier only.    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    MUTYH mutation p.V234M [facility], reclassified 9/15/2020     Past Surgical History:   Procedure Laterality Date     COLONOSCOPY       ENDOSCOPIC ULTRASOUND UPPER GASTROINTESTINAL TRACT (GI) N/A 2016    Procedure: ENDOSCOPIC ULTRASOUND, ESOPHAGOSCOPY / UPPER GASTROINTESTINAL TRACT (GI);  Surgeon: Miguel A Varela MD;  Location: [facility] OR     GYN SURGERY           ORTHOPEDIC SURGERY      right knee scope       Allergies:  Allergies as of 2022 - Reviewed 2022   Allergen Reaction Noted     Codeine Hives 2016       Current Medications:  Current Outpatient Medications   Medication Sig Dispense Refill     ketoconazole (NIZORAL) 2 % external shampoo        loratadine (CLARITIN) 10 MG tablet Take 10 mg by mouth daily       multivitamin, therapeutic with minerals (THERA-VIT-M) TABS Take 1 tablet by mouth daily       " sertraline (ZOLOFT) 25 MG tablet        sertraline (ZOLOFT) 50 MG tablet TAKE 1 TABLET BY MOUTH DAILY. TAKE IN ADDITION TO THE 25 MG DOSE FOR TOTAL DAILY DOSE OF 75 MG PER DAY. GENERIC EQUIVALENT FOR ZOLOFT       VITAMIN D, CHOLECALCIFEROL, PO Take 2,000 Units by mouth daily           Family History:  Family History   Problem Relation Age of Onset     Breast Cancer Mother 76         at 79     Bladder Cancer Father 65     Pancreatic Cancer Sister 54         at 56; history of pancreatitis     Breast Cancer Sister 40        DCIS     Colon Polyps Brother         more than 100     Pancreatic Cancer Maternal Grandmother 69         at 70     Lung Cancer Maternal Grandfather 70     Esophageal Cancer Paternal Grandmother 95         at 99     Ovarian Cancer Maternal Aunt 86     Melanoma Paternal Uncle 80     Pancreatic Cancer Maternal Cousin 76        possibly other risk factors       Social History:  Social History     Socioeconomic History     Marital status:      Spouse name: Ed     Number of children: 2     Years of education: Not on file     Highest education level: Not on file   Occupational History     Employer: RETIRED     Comment: [employer]   Tobacco Use     Smoking status: Never Smoker     Smokeless tobacco: Never Used   Substance and Sexual Activity     Alcohol use: Yes     Comment: 1/week     Drug use: No     Sexual activity: Yes     Partners: Male   Other Topics Concern     Not on file   Social History Narrative     Not on file     Social Determinants of Health     Financial Resource Strain: Not on file   Food Insecurity: Not on file   Transportation Needs: Not on file   Physical Activity: Not on file   Stress: Not on file   Social Connections: Not on file   Intimate Partner Violence: Not on file   Housing Stability: Not on file       Physical Exam:  There were no vitals taken for this visit.  GENERAL: Healthy, alert and no distress  EYES: Eyes grossly normal to inspection.  No  discharge or erythema, or obvious scleral/conjunctival abnormalities.  RESP: No audible wheeze, cough, or visible cyanosis.  No visible retractions or increased work of breathing.    SKIN: Visible skin clear. No significant rash, abnormal pigmentation or lesions.  NEURO: Cranial nerves grossly intact.  Mentation and speech appropriate for age.  PSYCH: Mentation appears normal, affect normal/bright, judgement and insight intact, normal speech and appearance well-groomed.    Laboratory/Imaging Studies  No results found for any visits on 02/04/22.    Results:  The Galleri test looks for signals present in the blood that could indicate the presence of cancer.  When the cancer signal is detected, the test predicts the origin of the cancer signal with high accuracy.  However the Galleri test cannot detect all cancers and not all cancers can be detected in the blood.  The Galleri test does not detect specific genetic mutations and does not determine in individuals risk of developing cancer in the future.    Cindy's genetic test result is negative, noting a cancer signal was not detected.  This means that no cancer signal was found at this time.  It does not rule out the presence of cancer or predict whether she will develop cancer in the future.  Not all cancers can be detected in the blood or by the Galleri test.    If a cancer signal would have been found, the test would have analyzed patterns to identify where in the body of the signal may have come from.    Next steps:  1.  Continue with any routine cancer screening tests your healthcare provider recommends, such as mammograms and colonoscopies.  2.  Do not ignore cancer signs or symptoms if they occur, as this could lead to delayed diagnosis.    3.  Discuss  the appropriate timing of cancer screenings with your health care provider to help increase the chance of earlier detection.  4. If you choose to repeat the Galleri test, the appropriate timeline would be to  repeat it in one year.    We discussed that Cindy has not had follow up with Dr. Varela for continued pancreatic screening after her 2016 endoscopic ultrasound.  I will help her get scheduled for follow up with him, and she will likely have an MRCP as her next follow up.      She asked about further genetic testing for any new genes linked to the cancers in her family; Cinthia Sarmiento, [facility], confirmed that there are a few genes associated with colon polyps and melanoma that she may want to have genetic testing for and she will be happy to see her for that. The Next Generation Sequencing methodology is the same.    Cinyd will contact me if she chooses to re-test with [facility] and will connect with Cinthia if she would like to discuss further genetic testing.       I spent a total of 30 minutes on the day of the visit. Please see the note for further information on patient assessment and treatment.    Kasandra Clement, YSABEL-CNS, OCN, AGN-BC  Clinical Nurse Specialist  Cancer Risk Management Program  [facility]  phone:  [phone]  fax: [phone]    CC; Fabienne Monge MD

## 2022-02-04 NOTE — LETTER
2022         RE: Cindy Wilson  [address] [address]        Dear Cindy,     Please see a copy of my visit note below.    Cindy is a 62 year old who is being evaluated via a billable video visit.      How would you like to obtain your AVS? MyChart  If the video visit is dropped, the invitation should be resent by: [phone] via text  Will anyone else be joining your video visit? Rita    Kika Agosto    Video Start Time: 1215  Video-Visit Details    Type of service:  Video Visit    Video End Time:1240    Originating Location (pt. Location): Home    Distant Location (provider location):  [facility]     Platform used for Video Visit: Lilli White is a 62 year old who is being evaluated via a billable video visit.      How would you like to obtain your AVS? Mail a copy    Oncology Risk Management Consultation:  Date on this visit: 2022    Cindy Wilson is participating in a billable video visit today to discuss her Galleri test results She is here today with her , Ed.     Primary Physician: Fabienne Monge MD    History Of Present Illness:  Ms. Wilson is a very pleasant, healthy 62 year old female who presents to discuss the results of her Galleri test.  She has a family history of ovarian, pancreatic, esophageal and other cancers, as well as a personal history of melanoma.    Genetic testin2015. Cancer Next panel from [facility]. (This testing was done because of Cindy's personal history of melanoma and family history of colon polyps, breast, ovarian, and pancreatic cancer.)  Cindy was found to be negative for pathogenic mutations in all genes tested but was found to have two variants of unknown significance (VUS), one in the APC gene and another in the MUTYH gene. The APC variant is called p.V4999C (also known as c.7717A>G). The MUTYH variant is called p.V234M (also known as c.700G>A).     No mutations were found in any of the other  "tested genes: TODD, BARD1, BMPR1A, BRCA1, BRCA2, BRIP1, CDH1, CDK4, CDKN2A, CHEK2, MLH1, MRE11A, MSH2, MSH6, NBN, NF1, PALB2, PMS2, POLD1, POLE, PTEN, RAD50, RAD51C, RAD51D, SMAD4, SMARCA4, STK11, and TP53.    2017- Updated Result: APC Gene:  The variant in the APC gene was reclassified as \"variant, likely benign\".  This means that the APC variant found in Cindy is most likely NOT associated with Familial Adenomatous Polyposis.     2021- Updated Result: MUTYH Gene  The testing done through [facility] in  was upgraded to note that the previously identified \"variant of unknown significance\" is now reclassified to \"variant, likely pathogenic.\"  The variant identified one mutation in the MUTYH gene. Specifically, the mutation is called p.V234M (also known as c.700G>A).   Mutations in the MUTYH gene cause a condition called MAP (MUTYH-Associated Polyposis). Carrying just one mutation in MUTYH means you are a carrier of MAP, and have a slightly increased risk for colon cancer. Carrying two mutations in the MUTYH gene means you have MAP.  Cindy is a carrier only.    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    MUTYH mutation p.V234M [facility], reclassified 9/15/2020     Past Surgical History:   Procedure Laterality Date     COLONOSCOPY       ENDOSCOPIC ULTRASOUND UPPER GASTROINTESTINAL TRACT (GI) N/A 2016    Procedure: ENDOSCOPIC ULTRASOUND, ESOPHAGOSCOPY / UPPER GASTROINTESTINAL TRACT (GI);  Surgeon: Miguel A Varela MD;  Location: [facility] OR     GYN SURGERY           ORTHOPEDIC SURGERY      right knee scope       Allergies:  Allergies as of 2022 - Reviewed 2022   Allergen Reaction Noted     Codeine Hives 2016       Current Medications:  Current Outpatient Medications   Medication Sig Dispense Refill     ketoconazole (NIZORAL) 2 % external shampoo        loratadine " (CLARITIN) 10 MG tablet Take 10 mg by mouth daily       multivitamin, therapeutic with minerals (THERA-VIT-M) TABS Take 1 tablet by mouth daily       sertraline (ZOLOFT) 25 MG tablet        sertraline (ZOLOFT) 50 MG tablet TAKE 1 TABLET BY MOUTH DAILY. TAKE IN ADDITION TO THE 25 MG DOSE FOR TOTAL DAILY DOSE OF 75 MG PER DAY. GENERIC EQUIVALENT FOR ZOLOFT       VITAMIN D, CHOLECALCIFEROL, PO Take 2,000 Units by mouth daily           Family History:  Family History   Problem Relation Age of Onset     Breast Cancer Mother 76         at 79     Bladder Cancer Father 65     Pancreatic Cancer Sister 54         at 56; history of pancreatitis     Breast Cancer Sister 40        DCIS     Colon Polyps Brother         more than 100     Pancreatic Cancer Maternal Grandmother 69         at 70     Lung Cancer Maternal Grandfather 70     Esophageal Cancer Paternal Grandmother 95         at 99     Ovarian Cancer Maternal Aunt 86     Melanoma Paternal Uncle 80     Pancreatic Cancer Maternal Cousin 76        possibly other risk factors       Social History:  Social History     Socioeconomic History     Marital status:      Spouse name: Ed     Number of children: 2     Years of education: Not on file     Highest education level: Not on file   Occupational History     Employer: RETIRED     Comment: [employer]   Tobacco Use     Smoking status: Never Smoker     Smokeless tobacco: Never Used   Substance and Sexual Activity     Alcohol use: Yes     Comment: 1/week     Drug use: No     Sexual activity: Yes     Partners: Male   Other Topics Concern     Not on file   Social History Narrative     Not on file     Social Determinants of Health     Financial Resource Strain: Not on file   Food Insecurity: Not on file   Transportation Needs: Not on file   Physical Activity: Not on file   Stress: Not on file   Social Connections: Not on file   Intimate Partner Violence: Not on file   Housing Stability: Not on file        Physical Exam:  There were no vitals taken for this visit.  GENERAL: Healthy, alert and no distress  EYES: Eyes grossly normal to inspection.  No discharge or erythema, or obvious scleral/conjunctival abnormalities.  RESP: No audible wheeze, cough, or visible cyanosis.  No visible retractions or increased work of breathing.    SKIN: Visible skin clear. No significant rash, abnormal pigmentation or lesions.  NEURO: Cranial nerves grossly intact.  Mentation and speech appropriate for age.  PSYCH: Mentation appears normal, affect normal/bright, judgement and insight intact, normal speech and appearance well-groomed.    Laboratory/Imaging Studies  No results found for any visits on 02/04/22.    Results:  The Galleri test looks for signals present in the blood that could indicate the presence of cancer.  When the cancer signal is detected, the test predicts the origin of the cancer signal with high accuracy.  However the Galleri test cannot detect all cancers and not all cancers can be detected in the blood.  The Galleri test does not detect specific genetic mutations and does not determine in individuals risk of developing cancer in the future.    Cindy's genetic test result is negative, noting a cancer signal was not detected.  This means that no cancer signal was found at this time.  It does not rule out the presence of cancer or predict whether she will develop cancer in the future.  Not all cancers can be detected in the blood or by the Galleri test.    If a cancer signal would have been found, the test would have analyzed patterns to identify where in the body of the signal may have come from.    Next steps:  1.  Continue with any routine cancer screening tests your healthcare provider recommends, such as mammograms and colonoscopies.  2.  Do not ignore cancer signs or symptoms if they occur, as this could lead to delayed diagnosis.    3.  Discuss  the appropriate timing of cancer screenings with your health  care provider to help increase the chance of earlier detection.  4. If you choose to repeat the Galleri test, the appropriate timeline would be to repeat it in one year.    We discussed that Cindy has not had follow up with Dr. Varela for continued pancreatic screening after her 2016 endoscopic ultrasound.  I will help her get scheduled for follow up with him, and she will likely have an MRCP as her next follow up.      She asked about further genetic testing for any new genes linked to the cancers in her family; Cinthia Sarmiento, [facility], confirmed that there are a few genes associated with colon polyps and melanoma that she may want to have genetic testing for and she will be happy to see her for that. The Next Generation Sequencing methodology is the same.    Cindy will contact me if she chooses to re-test with Galleri and will connect with Cinthia if she would like to discuss further genetic testing.       I spent a total of 30 minutes on the day of the visit. Please see the note for further information on patient assessment and treatment.    Kasandra Clement, APRN-CNS, OCN, AGN-BC  Clinical Nurse Specialist  Cancer Risk Management Program  [facility]  phone:  [phone]  fax: [phone]    CC; Fabienne Monge MD

## 2022-02-08 ENCOUNTER — TELEPHONE (OUTPATIENT)
Dept: GASTROENTEROLOGY | Facility: CLINIC | Age: 62
End: 2022-02-08
Payer: COMMERCIAL

## 2022-02-08 NOTE — TELEPHONE ENCOUNTER
From: Kasandra Clement APRN CNS   Sent: 2/4/2022   1:57 PM CST   To: Patrizia Núñez, RN, Miguel Art Wolf Varela MD   Subject: follow up from EUS in 2016                       Hi Dr. Varela,     ...She saw you for an EUS in 2016 as she has a very strong family history of pancreatic cancer.  She has been lost to followup; I think you wanted to see her in 3 years (2019) for a consult and possibly an MRCP.     Called Pt to get her scheduled. No answer. Left VM for Pt to call back.    Ayush Daley LPN

## 2022-03-25 LAB — SCANNED LAB RESULT: NORMAL

## 2022-05-10 ENCOUNTER — TELEPHONE (OUTPATIENT)
Dept: GASTROENTEROLOGY | Facility: CLINIC | Age: 62
End: 2022-05-10
Payer: COMMERCIAL

## 2022-05-10 NOTE — TELEPHONE ENCOUNTER
LVM to alecia pt appt on 5/12. Pt cancellled due to covid and would like to to alecia please alecia next avail

## 2022-05-17 ENCOUNTER — TELEPHONE (OUTPATIENT)
Dept: GASTROENTEROLOGY | Facility: CLINIC | Age: 62
End: 2022-05-17
Payer: COMMERCIAL

## 2022-11-29 DIAGNOSIS — Z80.0 FAMILY HISTORY OF PANCREATIC CANCER: Primary | ICD-10-CM

## 2023-04-22 ENCOUNTER — HEALTH MAINTENANCE LETTER (OUTPATIENT)
Age: 63
End: 2023-04-22

## 2023-06-02 ENCOUNTER — DOCUMENTATION ONLY (OUTPATIENT)
Dept: GASTROENTEROLOGY | Facility: CLINIC | Age: 63
End: 2023-06-02
Payer: COMMERCIAL

## 2023-06-02 NOTE — PROGRESS NOTES
Called PT and confirmed Appointment.    Called to remind patient of their upcoming appointment with our GI clinic, on 06/08/23 at 9:30 AM with Dr. Miguel A Varela. This appointment is scheduled as an in-person appt. Please arrive 15 minutes early to check in for your appointment. , if your appointment is virtual (video or telephone) you need to be in [place] for the visit. To reschedule or cancel patient to call [phone].      SK

## 2023-06-08 ENCOUNTER — OFFICE VISIT (OUTPATIENT)
Dept: GASTROENTEROLOGY | Facility: CLINIC | Age: 63
End: 2023-06-08
Attending: CLINICAL NURSE SPECIALIST
Payer: COMMERCIAL

## 2023-06-08 VITALS
DIASTOLIC BLOOD PRESSURE: 68 MMHG | HEART RATE: 72 BPM | SYSTOLIC BLOOD PRESSURE: 105 MMHG | WEIGHT: 130.4 LBS | BODY MASS INDEX: 20.47 KG/M2 | HEIGHT: 67 IN | OXYGEN SATURATION: 98 %

## 2023-06-08 DIAGNOSIS — Z80.0 FAMILY HISTORY OF PANCREATIC CANCER: ICD-10-CM

## 2023-06-08 PROCEDURE — 99205 OFFICE O/P NEW HI 60 MIN: CPT | Performed by: INTERNAL MEDICINE

## 2023-06-08 RX ORDER — DESOXIMETASONE 2.5 MG/G
CREAM TOPICAL 2 TIMES DAILY
COMMUNITY

## 2023-06-08 RX ORDER — ALBUTEROL SULFATE 90 UG/1
2 AEROSOL, METERED RESPIRATORY (INHALATION) EVERY 6 HOURS PRN
COMMUNITY

## 2023-06-08 RX ORDER — TRIAMCINOLONE ACETONIDE 1 MG/G
CREAM TOPICAL 2 TIMES DAILY
COMMUNITY

## 2023-06-08 ASSESSMENT — PAIN SCALES - GENERAL: PAINLEVEL: NO PAIN (0)

## 2023-06-08 NOTE — LETTER
6/8/2023         RE: Cindy Wilson  [address] [facility] [address]        Dear Colleague,    Thank you for referring your patient, Cindy Wilson, to the [facility] [place]. Please see a copy of my visit note below.        [facility]    CC/Referring Physician:  Kasandra Clement  Question for Consultation:  Family history of pancreatic cancer    Assessment and Plan:  Ms Wilson is a 64yo woman with a strong family history of pancreatic cancer who has not had image screening/surveillance since 2006. We reviewed our options for evaluation, including both EUS and MRI/MRCP, as these are equally sensitive. As MRI is noninvasive we will organize these yearly until such time as a finding requires sampling, at which point we will reflex an EUS for fine needle biopsy. We reviewed the technique of EUS including need for deep sedation and rare risks such bleeding, infection and pancreatitis. We also discussed that MRI may demonstrate unrelated cystic lesions which themselves deserve surveillance as they may represent premalignant lesions such as side branched intraductal papillary mucinous neoplasms.    In terms of her carrier state of MUYTH, data suggests she has an increased risk for colon cancer, however unlike [place], adenomas would likely take near a decade to advance to malignancy. Rather this syndrome is complicated by quantity of polyps and she had only two in 2021.    She has no modifiable risk factors for pancreatic disease.    Plan  Yearly MRI/MRCP with IV contrast  Repeat colonoscopy in 2026    [facility] as clinical course dictates    It was a pleasure to participate in the care of this patient; please contact us with any further questions.  A total of at least 60 minutes was spent in evaluation of this patient today, >50% of which was discussion and counseling regarding the above delineated issues.      Miguel A Varela MD PhD FACG  LUANA DAY  Professor of Medicine, Surgery and Pediatrics  Interventional and Therapeutic Endoscopy  Chief, Division of Gastroenterology and Hepatology and Nutrition  GI Service Line Medical Director    [facility] [address] [place], [place] [address]    New Consultations  [phone]  Procedure Scheduling  [phone]  Clinical Nurse Coordinator [phone]  Clinical Fax   [phone]  Administrative   [phone]  Administrative Fax  [phone]    -------------------------------------------------------------------------------------------------------------------  History of Presentation:   Ms Wilson (Dr Wilson's wife) is a 63 year old woman with a strong family history of pancreatic cancer (one first degree, sister at 54, and three maternal second degree in their late 60s/[id number]) who presents to discuss her risks of pancreatic cancer and if surveillance is appropriate. She has undergone a few different genetic tests, in part due to her family history of pancreatic cancer, but also because of other cancers including ovarian, skin, and breast. Her brother had many polyps and is a carrier of MUTYH associated adenomatous polyposis. She and her son tested positive as a carrier.    She had an EUS in 2016 which was unremarkable as was her MRI that year as well. Her last colonoscopy was in May 2021, 2 3-4mm polyps.    She does not smoke tobacco or abuse alcohol and her BMI is approximately 20.    Review of systems:  A twelve point review was performed and was otherwise negative beyond what is mentioned in the history above.    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    MUTYH mutation p.V234M [place], reclassified 9/15/2020     Previous surgeries:  Past Surgical History:   Procedure Laterality Date    COLONOSCOPY      ENDOSCOPIC ULTRASOUND UPPER  GASTROINTESTINAL TRACT (GI) N/A 2016    Procedure: ENDOSCOPIC ULTRASOUND, ESOPHAGOSCOPY / UPPER GASTROINTESTINAL TRACT (GI);  Surgeon: Miguel A Varela MD;  Location: [facility] OR    GYN SURGERY          ORTHOPEDIC SURGERY      right knee scope     Current mediations:  Current Outpatient Medications   Medication    albuterol (PROAIR HFA/PROVENTIL HFA/VENTOLIN HFA) 108 (90 Base) MCG/ACT inhaler    Ca Phosphate-Cholecalciferol (CALCIUM 500 + D3) 250-12.5 MG-MCG CHEW    desoximetasone (TOPICORT) 0.25 % external cream    ketoconazole (NIZORAL) 2 % external shampoo    loratadine (CLARITIN) 10 MG tablet    sertraline (ZOLOFT) 25 MG tablet    sertraline (ZOLOFT) 50 MG tablet    triamcinolone (KENALOG) 0.1 % external cream    VITAMIN D, CHOLECALCIFEROL, PO    multivitamin, therapeutic with minerals (THERA-VIT-M) TABS     No current facility-administered medications for this visit.     Medications reviewed with patient today, see Medication List/Assessment for details.  No other NSAID/anticoagulation reported by patient.  No other OTC/herbal/supplements reported by patient.    Social history:  Social History     Socioeconomic History    Marital status:      Spouse name: Ed    Number of children: 2    Years of education: Not on file    Highest education level: Not on file   Occupational History     Employer: RETIRED     Comment: [employer]   Tobacco Use    Smoking status: Never    Smokeless tobacco: Never   Vaping Use    Vaping status: Not on file   Substance and Sexual Activity    Alcohol use: Yes     Comment: 1/week    Drug use: No    Sexual activity: Yes     Partners: Male   Other Topics Concern    Not on file   Social History Narrative    Not on file     Social Determinants of Health     Financial Resource Strain: Not on file   Food Insecurity: Not on file   Transportation Needs: Not on file   Physical Activity: Not on file   Stress: Not on file   Social Connections: Not on file   Intimate Partner  "Violence: Not on file   Housing Stability: Not on file       Family history:  Family History   Problem Relation Age of Onset    Breast Cancer Mother 76         at 79    Bladder Cancer Father 65    Pancreatic Cancer Sister 54         at 56; history of pancreatitis    Breast Cancer Sister 40        DCIS    Colon Polyps Brother         more than 100    Pancreatic Cancer Maternal Grandmother 69         at 70    Lung Cancer Maternal Grandfather 70    Esophageal Cancer Paternal Grandmother 95         at 99    Ovarian Cancer Maternal Aunt 86    Melanoma Paternal Uncle 80    Pancreatic Cancer Maternal Cousin 76        possibly other risk factors     No colon/panc/other GI CA, no other HNPCC-related [place].  No IBD/celiac, no AI/liver disease.    PHYSICAL EXAMINATION:  Vitals reviewed, AFVSS   Wt   Wt Readings from Last 2 Encounters:   22 59.9 kg (132 lb)   16 59.7 kg (131 lb 9.8 oz)      Gen: nontoxic, aaox3, cooperative, pleasant, not dyspneic/diaphoretic  HEENT: neck supple, normal op w/o ulcer/exudate, anicteric  Resp/CV unremarkable without significant finding  Abd: benign, soft, nondistended, intact bs, no hepatosplenomegaly, nontender, no peritoneal signs  Ext: no c/c/e  Skin: warm, perfused, no jaundice  Neuro: grossly intact    Pertinent outside studies:        Chief Complaint   Patient presents with    New Patient       Vitals:    23 0931   BP: 105/68   BP Location: Left arm   Patient Position: Sitting   Cuff Size: Adult Regular   Pulse: 72   SpO2: 98%   Weight: 59.1 kg (130 lb 6.4 oz)   Height: 1.702 m (5' 7\")       Body mass index is 20.42 kg/m .    Natalia Carpenter        Again, thank you for allowing me to participate in the care of your patient.        Sincerely,        Miguel A Varela MD    "

## 2023-06-08 NOTE — PATIENT INSTRUCTIONS
Follow up:    Dr. Varela has outlined the following steps after your recent clinic visit:       Plan  Yearly MRI/MRCP with IV contrast. Please call [phone] to schedule the imaging.     Repeat colonoscopy in 2026     [facility] as clinical course dictates      Please call with any questions or concerns regarding your clinic visit today.    It is a pleasure being involved in your health care.    Contacts post-consultation depending on your need:    Schedule Clinic Appointments            [phone] # 1   M-F 7:30 - 5 pm    Patrizia Núñez, RN Care Coordinator (Dr. Varela/Dr. Howard)  [phone]    Nadira Tomlinson, RN Care Coordinator (Dr. Douglas)   [phone]    Pita Drummond, RN Care Coordinator (Dr. Pardo/Dr. Kwok)  [phone]      For urgent/emergent questions after business hours, you may reach the on-call GI Fellow by contacting the [facility]  at [phone].    How do I schedule labs, imaging studies, or procedures that were ordered in clinic today?     Labs: To schedule lab appointment at the [facility], use my chart or call [phone]. If you have a [place] lab closer to home where you are regularly seen you can give them a call.     Procedures: If a colonoscopy, upper endoscopy, breath test, esophageal manometry, or pH impedence was ordered today, our endoscopy team will call you to schedule this. If you have not heard from our endoscopy team within a week, please call [phone] to schedule.     Imaging Studies: If you were scheduled for a CT scan, X-ray, MRI, ultrasound, HIDA scan or other imaging study, please call [phone] to have this scheduled.     Referral: If a referral to another specialty was ordered, expect a phone call or follow instructions above. If you have not heard from anyone regarding your referral in a week, please call our clinic to check the status.     How to I schedule a follow-up visit?  If you did not schedule a follow-up  visit today, please call [phone] to schedule a follow-up office visit.

## 2023-06-08 NOTE — PROGRESS NOTES
"Chief Complaint   Patient presents with     New Patient       Vitals:    06/08/23 0931   BP: 105/68   BP Location: Left arm   Patient Position: Sitting   Cuff Size: Adult Regular   Pulse: 72   SpO2: 98%   Weight: 59.1 kg (130 lb 6.4 oz)   Height: 1.702 m (5' 7\")       Body mass index is 20.42 kg/m .    Natalia Carpenter"

## 2023-06-08 NOTE — PROGRESS NOTES
[facility]    CC/Referring Physician:  Kasandra Clement  Question for Consultation:  Family history of pancreatic cancer    Assessment and Plan:  Ms Wilson is a 62yo woman with a strong family history of pancreatic cancer who has not had image screening/surveillance since 2006. We reviewed our options for evaluation, including both EUS and MRI/MRCP, as these are equally sensitive. As MRI is noninvasive we will organize these yearly until such time as a finding requires sampling, at which point we will reflex an EUS for fine needle biopsy. We reviewed the technique of EUS including need for deep sedation and rare risks such bleeding, infection and pancreatitis. We also discussed that MRI may demonstrate unrelated cystic lesions which themselves deserve surveillance as they may represent premalignant lesions such as side branched intraductal papillary mucinous neoplasms.    In terms of her carrier state of MUYTH, data suggests she has an increased risk for colon cancer, however unlike [place], adenomas would likely take near a decade to advance to malignancy. Rather this syndrome is complicated by quantity of polyps and she had only two in 2021.    She has no modifiable risk factors for pancreatic disease.    Plan  Yearly MRI/MRCP with IV contrast  Repeat colonoscopy in 2026    [facility] as clinical course dictates    It was a pleasure to participate in the care of this patient; please contact us with any further questions.  A total of at least 60 minutes was spent in evaluation of this patient today, >50% of which was discussion and counseling regarding the above delineated issues.      Miguel A Varela MD PhD FACG LUANA DAY  Professor of Medicine, Surgery and Pediatrics  Interventional and Therapeutic Endoscopy  Chief, Division of Gastroenterology and Hepatology and Nutrition  GI Service Line Medical Director    [facility] [address]  [place], [place] [address]    New Consultations  [phone]  Procedure Scheduling  [phone]  Clinical Nurse Coordinator [phone]  Clinical Fax   [phone]  Administrative   [phone]  Administrative Fax  [phone]    -------------------------------------------------------------------------------------------------------------------  History of Presentation:   Ms Wilson (Dr Wilson's wife) is a 63 year old woman with a strong family history of pancreatic cancer (one first degree, sister at 54, and three maternal second degree in their late 60s/[id number]) who presents to discuss her risks of pancreatic cancer and if surveillance is appropriate. She has undergone a few different genetic tests, in part due to her family history of pancreatic cancer, but also because of other cancers including ovarian, skin, and breast. Her brother had many polyps and is a carrier of MUTYH associated adenomatous polyposis. She and her son tested positive as a carrier.    She had an EUS in  which was unremarkable as was her MRI that year as well. Her last colonoscopy was in May 2021, 2 3-4mm polyps.    She does not smoke tobacco or abuse alcohol and her BMI is approximately 20.    Review of systems:  A twelve point review was performed and was otherwise negative beyond what is mentioned in the history above.    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    MUTYH mutation p.V234M [place], reclassified 9/15/2020     Previous surgeries:  Past Surgical History:   Procedure Laterality Date     COLONOSCOPY       ENDOSCOPIC ULTRASOUND UPPER GASTROINTESTINAL TRACT (GI) N/A 2016    Procedure: ENDOSCOPIC ULTRASOUND, ESOPHAGOSCOPY / UPPER GASTROINTESTINAL TRACT (GI);  Surgeon: Miguel A Varela MD;  Location: [facility] OR     GYN SURGERY           ORTHOPEDIC SURGERY      right knee scope     Current  mediations:  Current Outpatient Medications   Medication     albuterol (PROAIR HFA/PROVENTIL HFA/VENTOLIN HFA) 108 (90 Base) MCG/ACT inhaler     Ca Phosphate-Cholecalciferol (CALCIUM 500 + D3) 250-12.5 MG-MCG CHEW     desoximetasone (TOPICORT) 0.25 % external cream     ketoconazole (NIZORAL) 2 % external shampoo     loratadine (CLARITIN) 10 MG tablet     sertraline (ZOLOFT) 25 MG tablet     sertraline (ZOLOFT) 50 MG tablet     triamcinolone (KENALOG) 0.1 % external cream     VITAMIN D, CHOLECALCIFEROL, PO     multivitamin, therapeutic with minerals (THERA-VIT-M) TABS     No current facility-administered medications for this visit.     Medications reviewed with patient today, see Medication List/Assessment for details.  No other NSAID/anticoagulation reported by patient.  No other OTC/herbal/supplements reported by patient.    Social history:  Social History     Socioeconomic History     Marital status:      Spouse name: Ed     Number of children: 2     Years of education: Not on file     Highest education level: Not on file   Occupational History     Employer: RETIRED     Comment: [employer]   Tobacco Use     Smoking status: Never     Smokeless tobacco: Never   Vaping Use     Vaping status: Not on file   Substance and Sexual Activity     Alcohol use: Yes     Comment: 1/week     Drug use: No     Sexual activity: Yes     Partners: Male   Other Topics Concern     Not on file   Social History Narrative     Not on file     Social Determinants of Health     Financial Resource Strain: Not on file   Food Insecurity: Not on file   Transportation Needs: Not on file   Physical Activity: Not on file   Stress: Not on file   Social Connections: Not on file   Intimate Partner Violence: Not on file   Housing Stability: Not on file       Family history:  Family History   Problem Relation Age of Onset     Breast Cancer Mother 76         at 79     Bladder Cancer Father 65     Pancreatic Cancer Sister 54         at  56; history of pancreatitis     Breast Cancer Sister 40        DCIS     Colon Polyps Brother         more than 100     Pancreatic Cancer Maternal Grandmother 69         at 70     Lung Cancer Maternal Grandfather 70     Esophageal Cancer Paternal Grandmother 95         at 99     Ovarian Cancer Maternal Aunt 86     Melanoma Paternal Uncle 80     Pancreatic Cancer Maternal Cousin 76        possibly other risk factors     No colon/panc/other GI CA, no other HNPCC-related [place].  No IBD/celiac, no AI/liver disease.    PHYSICAL EXAMINATION:  Vitals reviewed, AFVSS   Wt   Wt Readings from Last 2 Encounters:   22 59.9 kg (132 lb)   16 59.7 kg (131 lb 9.8 oz)      Gen: nontoxic, aaox3, cooperative, pleasant, not dyspneic/diaphoretic  HEENT: neck supple, normal op w/o ulcer/exudate, anicteric  Resp/CV unremarkable without significant finding  Abd: benign, soft, nondistended, intact bs, no hepatosplenomegaly, nontender, no peritoneal signs  Ext: no c/c/e  Skin: warm, perfused, no jaundice  Neuro: grossly intact    Pertinent outside studies:

## 2023-06-26 ENCOUNTER — HOSPITAL ENCOUNTER (OUTPATIENT)
Dept: MRI IMAGING | Facility: CLINIC | Age: 63
Discharge: HOME | End: 2023-06-26
Attending: INTERNAL MEDICINE | Admitting: INTERNAL MEDICINE
Payer: COMMERCIAL

## 2023-06-26 DIAGNOSIS — Z80.0 FAMILY HISTORY OF PANCREATIC CANCER: ICD-10-CM

## 2023-06-26 PROCEDURE — 255N000002 HC RX 255 OP 636: Mod: JZ | Performed by: INTERNAL MEDICINE

## 2023-06-26 PROCEDURE — 74183 MRI ABD W/O CNTR FLWD CNTR: CPT

## 2023-06-26 PROCEDURE — A9585 GADOBUTROL INJECTION: HCPCS | Mod: JZ | Performed by: INTERNAL MEDICINE

## 2023-06-26 PROCEDURE — 74183 MRI ABD W/O CNTR FLWD CNTR: CPT | Mod: 26 | Performed by: RADIOLOGY

## 2023-06-26 RX ORDER — GADOBUTROL 604.72 MG/ML
6.5 INJECTION INTRAVENOUS ONCE
Status: COMPLETED | OUTPATIENT
Start: 2023-06-26 | End: 2023-06-26

## 2023-06-26 RX ADMIN — GADOBUTROL 6.5 ML: 604.72 INJECTION INTRAVENOUS at 09:23

## 2023-06-27 DIAGNOSIS — Z80.0 FAMILY HISTORY OF PANCREATIC CANCER: Primary | ICD-10-CM

## 2024-05-03 NOTE — PROGRESS NOTES
"Virtual Visit Details    Type of service:  Video Visit     Originating Location (pt. Location): Home  Distant Location (provider location):  On-site  Platform used for Video Visit: [facility]      Oncology Risk Management Consultation:  Date on this visit: 2024    Cindy Wilson returns to the cancer risk management program to discuss the risks and benefits of the Galleri test to detect cell free DNA from cancer tumors.      Primary Physician: Fabienne Monge MD     History Of Present Illness:  Ms. Wilson is a very pleasant, healthy 64 year old female who presents for a discussion of the risks and benefits of the Galleri test. She has a family history of ovarian, pancreatic, esophageal and other cancers, as well as a personal history of melanoma diagnosed in .     Genetic testin2015: Cancer Next panel from [facility]. (This testing was done because of Cindy's personal history of melanoma and family history of colon polyps, breast, ovarian, and pancreatic cancer.)  Cindy was found to be negative for pathogenic mutations in all genes tested but was found to have two variants of unknown significance (VUS), one in the APC gene and another in the MUTYH gene. The APC variant is called p.G9983U (also known as c.7717A>G). The MUTYH variant is called p.V234M (also known as c.700G>A).      No mutations were found in any of the other tested genes: TODD, BARD1, BMPR1A, BRCA1, BRCA2, BRIP1, CDH1, CDK4, CDKN2A, CHEK2, MLH1, MRE11A, MSH2, MSH6, NBN, NF1, PALB2, PMS2, POLD1, POLE, PTEN, RAD50, RAD51C, RAD51D, SMAD4, SMARCA4, STK11, and TP53.     2017- Updated Result: APC Gene:  The variant in the APC gene was reclassified as \"variant, likely benign\".  This means that the APC variant found in Cindy is most likely NOT associated with Familial Adenomatous Polyposis.      2021- Updated Result: MUTYH Gene  The testing done through [facility] in  was upgraded to note that the previously identified " "\"variant of unknown significance\" is now reclassified to \"variant, likely pathogenic.\"  The variant identified one mutation in the MUTYH gene. Specifically, the mutation is called p.V234M (also known as c.700G>A). Mutations in the MUTYH gene cause a condition called MAP (MUTYH-Associated Polyposis). Carrying just one mutation in MUTYH means you are a carrier of MAP, and have a slightly increased risk for colon cancer. Carrying two mutations in the MUTYH gene means you have MAP.  Cindy is a carrier only.      Pertinent Screening History:  2021: Colonoscopy (Henrik Pace MD at [facility]): Two, 2-4mm polyps removed in the transverse colon. Repeat colonoscopy in five years.   FINAL DIAGNOSIS    A.  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    MUTYH mutation p.V234M [facility], reclassified 9/15/2020     Past Surgical History:   Procedure Laterality Date    COLONOSCOPY      ENDOSCOPIC ULTRASOUND UPPER GASTROINTESTINAL TRACT (GI) N/A 2016    Procedure: ENDOSCOPIC ULTRASOUND, ESOPHAGOSCOPY / UPPER GASTROINTESTINAL TRACT (GI);  Surgeon: Miguel A Varela MD;  Location: [facility] OR    GYN SURGERY          ORTHOPEDIC SURGERY      right knee scope       Allergies:  Allergies as of 2024 - Reviewed 2024   Allergen Reaction Noted    Codeine Hives 2016       Current Medications:  Current Outpatient Medications   Medication Sig Dispense Refill    albuterol (PROAIR HFA/PROVENTIL HFA/VENTOLIN HFA) 108 (90 Base) MCG/ACT inhaler Inhale 2 puffs into the lungs every 6 hours as needed for shortness of breath, wheezing or cough      Ca Phosphate-Cholecalciferol (CALCIUM 500 + D3) 250-12.5 MG-MCG CHEW       desoximetasone (TOPICORT) 0.25 % external cream Apply topically 2 times " daily      ketoconazole (NIZORAL) 2 % external shampoo       loratadine (CLARITIN) 10 MG tablet Take 10 mg by mouth daily      multivitamin, therapeutic with minerals (THERA-VIT-M) TABS Take 1 tablet by mouth daily (Patient not taking: Reported on 2023)      sertraline (ZOLOFT) 25 MG tablet       sertraline (ZOLOFT) 50 MG tablet TAKE 1 TABLET BY MOUTH DAILY. TAKE IN ADDITION TO THE 25 MG DOSE FOR TOTAL DAILY DOSE OF 75 MG PER DAY. GENERIC EQUIVALENT FOR ZOLOFT      triamcinolone (KENALOG) 0.1 % external cream Apply topically 2 times daily      VITAMIN D, CHOLECALCIFEROL, PO Take 2,000 Units by mouth daily           Family History:  Family History   Problem Relation Age of Onset    Breast Cancer Mother 76         at 79    Bladder Cancer Father 65         at age 92 of bladder cancer    Pancreatic Cancer Sister 54         at 56; history of pancreatitis    Breast Cancer Sister 40        DCIS    Colon Polyps Brother         more than 100    Prostate Cancer Brother 60    Pancreatic Cancer Maternal Grandmother 69         at 70    Lung Cancer Maternal Grandfather 70    Esophageal Cancer Paternal Grandmother 95         at 99    Ovarian Cancer Maternal Aunt 86    Melanoma Paternal Uncle 80    Pancreatic Cancer Maternal Cousin 70        possibly other risk factors       Social History:  Social History     Socioeconomic History    Marital status:      Spouse name: Ed    Number of children: 2    Years of education: Not on file    Highest education level: Not on file   Occupational History     Employer: RETIRED     Comment: [employer]   Tobacco Use    Smoking status: Never     Passive exposure: Never (per pt)    Smokeless tobacco: Never   Substance and Sexual Activity    Alcohol use: Yes     Comment: 1/week    Drug use: No    Sexual activity: Yes     Partners: Male   Other Topics Concern    Not on file   Social History Narrative    Not on file     Social Determinants of Health     Financial  "Resource Strain: Not At Risk (4/24/2024)    [facility]    Financial Resource Strain     Is it hard for you to pay for the very basics like food, housing, medical care or heating?: No   Food Insecurity: Not At Risk (4/24/2024)    [facility]    Food Insecurity     Does your food run out before you have the money to buy more?: No   Transportation Needs: Not At Risk (4/24/2024)    [facility]    Transportation Needs     Does a lack of transportation keep you from your medical appointments or from getting your medications?: No   Physical Activity: Not on file   Stress: Not on file   Social Connections: Not on file   Interpersonal Safety: Not on file   Housing Stability: Not on file       Physical Exam:  Ht 1.702 m (5' 7\")   Wt 57.6 kg (126 lb 14.4 oz)   BMI 19.88 kg/m    GENERAL: alert and no distress  EYES: Eyes grossly normal to inspection.  No discharge or erythema, or obvious scleral/conjunctival abnormalities.  RESP: No audible wheeze, cough, or visible cyanosis.    SKIN: Visible skin clear. No significant rash, abnormal pigmentation or lesions.  NEURO: Cranial nerves grossly intact.  Mentation and speech appropriate for age.  PSYCH: Appropriate affect, tone, and pace of words    Laboratory/Imaging Studies  No results found for any visits on 05/08/24.    Results:    Cindy returns to the cancer risk management program to discuss cancer screening recommendations and the Galleri test. We reviewed her family history. She updated me that her dad passed away from bladder cancer last summer. We also updated her family history with her brother's prostate cancer. She is up to date on recommended general population cancer screenings. She had a mammogram in January, which was negative. Her last colonoscopy was on 5/17/2021, which showed one sessile serrated adenoma. It was recommended that this be repeated in five years. She follows with Renata Guerrero MD, " for routine dermatological exams. Her next visit scheduled for next month.     We reviewed that the Galleri test can be included in a routine visit with a simple blood draw. Although not yet FDA approved, the Galleri test is recommended for use in adults with an elevated risk for cancer, such as those aged 50 or older. It is intended to be used in addition to and not replace other cancer screening tests your healthcare provider recommends. The test works by detection of DNA methylation patterns using cell-free DNA (cfDNA) isolated from whole blood. DNA methylation is a natural process used by cells to regulate gene expression. Certain DNA methylation patterns can serve as a signal of cancer and provide information about the origin of the cancer signal. Not all cancers can be detected by the Galleri test.     The test detects more than 50 types of cancer, many of which do not have routine screening tests.  It has a low single false-positive rate of 0.5%. but it does have higher false negative rates, and the rate varies for different types of cancer. The Galleri test does not detect all cancers and should be used in addition to guideline-recommended cancer screenings such as mammography, colonoscopy, PSA, or cervical cancer screening.    We discussed that the NHS-Galleri trial is currently underway in the [place]. This is a randomized, controlled clinical trial being conducted alongside standard cancer screening with diagnostic follow-up in the [facility]  clinical practice setting.  About 140,000 participants have been enrolled at 150 mobile clinics around [place].  The trial will focus on evaluating an unprecedented number of people who do not have cancer symptoms and who represent diverse socio-economic and ethnic populations. The three year trial (5547-1041) will explore the expected benefits of detecting new cancers at an early stage when treatment is generally more successful. By screening participants annually  over three years, we will be able to explore how the Galleri test could be used alongside existing NHS screening programs.       I spent a total of 42 minutes on the day of the visit. Please see the note for further information on patient assessment and treatment.     Mary Burger, KEESHA, APRN, [facility]-BC  Clinical Nurse Specialist  Cancer Risk Management Program  [facility]    Cc:  Fabienne Monge MD

## 2024-05-08 ENCOUNTER — VIRTUAL VISIT (OUTPATIENT)
Dept: ONCOLOGY | Facility: CLINIC | Age: 64
End: 2024-05-08
Payer: COMMERCIAL

## 2024-05-08 VITALS — WEIGHT: 126.9 LBS | BODY MASS INDEX: 19.92 KG/M2 | HEIGHT: 67 IN

## 2024-05-08 DIAGNOSIS — Z12.5 SCREENING FOR PROSTATE CANCER: Primary | ICD-10-CM

## 2024-05-08 DIAGNOSIS — Z85.820 HISTORY OF MELANOMA: ICD-10-CM

## 2024-05-08 DIAGNOSIS — Z80.0 FAMILY HISTORY OF PANCREATIC CANCER: ICD-10-CM

## 2024-05-08 DIAGNOSIS — Z80.3 FAMILY HISTORY OF MALIGNANT NEOPLASM OF BREAST: ICD-10-CM

## 2024-05-08 DIAGNOSIS — Z80.52 FAMILY HISTORY OF BLADDER CANCER: ICD-10-CM

## 2024-05-08 PROCEDURE — 99215 OFFICE O/P EST HI 40 MIN: CPT | Mod: 95

## 2024-05-08 ASSESSMENT — PAIN SCALES - GENERAL: PAINLEVEL: NO PAIN (0)

## 2024-05-08 NOTE — NURSING NOTE
Is the patient currently in the state of [place]? YES    Visit mode:VIDEO    If the visit is dropped, the patient can be reconnected by: VIDEO VISIT: Text to cell phone:   Telephone Information:   Mobile [phone]       Will anyone else be joining the visit? NO  (If patient encounters technical issues they should call [phone]    How would you like to obtain your AVS? MyChart    Are changes needed to the allergy or medication list? Pt stated no med changes    Are refills needed on medications prescribed by this physician? NO    Reason for visit: RECHECK    No other vitals to report per pt    Lara GROVERF

## 2024-05-08 NOTE — LETTER
"    2024         RE: Cindy Wilson  [address] [address]        Dear Colleague,    Thank you for referring your patient, Cindy Wilson, to the [facility]. Please see a copy of my visit note below.    Virtual Visit Details    Type of service:  Video Visit     Originating Location (pt. Location): Home  Distant Location (provider location):  On-site  Platform used for Video Visit: [facility]      Oncology Risk Management Consultation:  Date on this visit: 2024    Cindy Wilson returns to the cancer risk management program to discuss the risks and benefits of the Galleri test to detect cell free DNA from cancer tumors.      Primary Physician: Fabienne Monge MD     History Of Present Illness:  Ms. Wilson is a very pleasant, healthy 64 year old female who presents for a discussion of the risks and benefits of the Galleri test. She has a family history of ovarian, pancreatic, esophageal and other cancers, as well as a personal history of melanoma diagnosed in .     Genetic testin2015: Cancer Next panel from [facility]. (This testing was done because of Cindy's personal history of melanoma and family history of colon polyps, breast, ovarian, and pancreatic cancer.)  Cindy was found to be negative for pathogenic mutations in all genes tested but was found to have two variants of unknown significance (VUS), one in the APC gene and another in the MUTYH gene. The APC variant is called p.F0524Y (also known as c.7717A>G). The MUTYH variant is called p.V234M (also known as c.700G>A).      No mutations were found in any of the other tested genes: TODD, BARD1, BMPR1A, BRCA1, BRCA2, BRIP1, CDH1, CDK4, CDKN2A, CHEK2, MLH1, MRE11A, MSH2, MSH6, NBN, NF1, PALB2, PMS2, POLD1, POLE, PTEN, RAD50, RAD51C, RAD51D, SMAD4, SMARCA4, STK11, and TP53.     2017- Updated Result: APC Gene:  The variant in the APC gene was reclassified as \"variant, likely benign\".  This means " "that the APC variant found in Cindy is most likely NOT associated with Familial Adenomatous Polyposis.      2021- Updated Result: MUTYH Gene  The testing done through [facility] in  was upgraded to note that the previously identified \"variant of unknown significance\" is now reclassified to \"variant, likely pathogenic.\"  The variant identified one mutation in the MUTYH gene. Specifically, the mutation is called p.V234M (also known as c.700G>A). Mutations in the MUTYH gene cause a condition called MAP (MUTYH-Associated Polyposis). Carrying just one mutation in MUTYH means you are a carrier of MAP, and have a slightly increased risk for colon cancer. Carrying two mutations in the MUTYH gene means you have MAP.  Cindy is a carrier only.      Pertinent Screening History:  2021: Colonoscopy (Henrik Pace MD at [facility]): Two, 2-4mm polyps removed in the transverse colon. Repeat colonoscopy in five years.   FINAL DIAGNOSIS    A.  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    MUTYH mutation p.V234M [facility], reclassified 9/15/2020     Past Surgical History:   Procedure Laterality Date    COLONOSCOPY      ENDOSCOPIC ULTRASOUND UPPER GASTROINTESTINAL TRACT (GI) N/A 2016    Procedure: ENDOSCOPIC ULTRASOUND, ESOPHAGOSCOPY / UPPER GASTROINTESTINAL TRACT (GI);  Surgeon: Miguel A Varela MD;  Location: [facility] OR    GYN SURGERY          ORTHOPEDIC SURGERY      right knee scope       Allergies:  Allergies as of 2024 - Reviewed 2024   Allergen Reaction Noted    Codeine Hives 2016       Current Medications:  Current Outpatient Medications   Medication Sig Dispense Refill    albuterol (PROAIR HFA/PROVENTIL HFA/VENTOLIN HFA) 108 (90 Base) MCG/ACT inhaler " Inhale 2 puffs into the lungs every 6 hours as needed for shortness of breath, wheezing or cough      Ca Phosphate-Cholecalciferol (CALCIUM 500 + D3) 250-12.5 MG-MCG CHEW       desoximetasone (TOPICORT) 0.25 % external cream Apply topically 2 times daily      ketoconazole (NIZORAL) 2 % external shampoo       loratadine (CLARITIN) 10 MG tablet Take 10 mg by mouth daily      multivitamin, therapeutic with minerals (THERA-VIT-M) TABS Take 1 tablet by mouth daily (Patient not taking: Reported on 2023)      sertraline (ZOLOFT) 25 MG tablet       sertraline (ZOLOFT) 50 MG tablet TAKE 1 TABLET BY MOUTH DAILY. TAKE IN ADDITION TO THE 25 MG DOSE FOR TOTAL DAILY DOSE OF 75 MG PER DAY. GENERIC EQUIVALENT FOR ZOLOFT      triamcinolone (KENALOG) 0.1 % external cream Apply topically 2 times daily      VITAMIN D, CHOLECALCIFEROL, PO Take 2,000 Units by mouth daily           Family History:  Family History   Problem Relation Age of Onset    Breast Cancer Mother 76         at 79    Bladder Cancer Father 65         at age 92 of bladder cancer    Pancreatic Cancer Sister 54         at 56; history of pancreatitis    Breast Cancer Sister 40        DCIS    Colon Polyps Brother         more than 100    Prostate Cancer Brother 60    Pancreatic Cancer Maternal Grandmother 69         at 70    Lung Cancer Maternal Grandfather 70    Esophageal Cancer Paternal Grandmother 95         at 99    Ovarian Cancer Maternal Aunt 86    Melanoma Paternal Uncle 80    Pancreatic Cancer Maternal Cousin 70        possibly other risk factors       Social History:  Social History     Socioeconomic History    Marital status:      Spouse name: Ed    Number of children: 2    Years of education: Not on file    Highest education level: Not on file   Occupational History     Employer: RETIRED     Comment: [employer]   Tobacco Use    Smoking status: Never     Passive exposure: Never (per pt)    Smokeless tobacco: Never   Substance and  "Sexual Activity    Alcohol use: Yes     Comment: 1/week    Drug use: No    Sexual activity: Yes     Partners: Male   Other Topics Concern    Not on file   Social History Narrative    Not on file     Social Determinants of Health     Financial Resource Strain: Not At Risk (4/24/2024)    Received from [facility]    Financial Resource Strain     Is it hard for you to pay for the very basics like food, housing, medical care or heating?: No   Food Insecurity: Not At Risk (4/24/2024)    Received from [facility]    Food Insecurity     Does your food run out before you have the money to buy more?: No   Transportation Needs: Not At Risk (4/24/2024)    Received from [facility]    Transportation Needs     Does a lack of transportation keep you from your medical appointments or from getting your medications?: No   Physical Activity: Not on file   Stress: Not on file   Social Connections: Not on file   Interpersonal Safety: Not on file   Housing Stability: Not on file       Physical Exam:  Ht 1.702 m (5' 7\")   Wt 57.6 kg (126 lb 14.4 oz)   BMI 19.88 kg/m    GENERAL: alert and no distress  EYES: Eyes grossly normal to inspection.  No discharge or erythema, or obvious scleral/conjunctival abnormalities.  RESP: No audible wheeze, cough, or visible cyanosis.    SKIN: Visible skin clear. No significant rash, abnormal pigmentation or lesions.  NEURO: Cranial nerves grossly intact.  Mentation and speech appropriate for age.  PSYCH: Appropriate affect, tone, and pace of words    Laboratory/Imaging Studies  No results found for any visits on 05/08/24.    Results:    Cindy returns to the cancer risk management program to discuss cancer screening recommendations and the Galleri test. We reviewed her family history. She updated me that her dad passed away from bladder cancer last summer. We also updated her family history with her brother's prostate cancer. She is up to date on recommended general population cancer " screenings. She had a mammogram in January, which was negative. Her last colonoscopy was on 5/17/2021, which showed one sessile serrated adenoma. It was recommended that this be repeated in five years. She follows with Renata Guerrero MD, for routine dermatological exams. Her next visit scheduled for next month.     We reviewed that the Galleri test can be included in a routine visit with a simple blood draw. Although not yet FDA approved, the Galleri test is recommended for use in adults with an elevated risk for cancer, such as those aged 50 or older. It is intended to be used in addition to and not replace other cancer screening tests your healthcare provider recommends. The test works by detection of DNA methylation patterns using cell-free DNA (cfDNA) isolated from whole blood. DNA methylation is a natural process used by cells to regulate gene expression. Certain DNA methylation patterns can serve as a signal of cancer and provide information about the origin of the cancer signal. Not all cancers can be detected by the Galleri test.     The test detects more than 50 types of cancer, many of which do not have routine screening tests.  It has a low single false-positive rate of 0.5%. but it does have higher false negative rates, and the rate varies for different types of cancer. The Galleri test does not detect all cancers and should be used in addition to guideline-recommended cancer screenings such as mammography, colonoscopy, PSA, or cervical cancer screening.    We discussed that the NHS-Galleri trial is currently underway in the [place]. This is a randomized, controlled clinical trial being conducted alongside standard cancer screening with diagnostic follow-up in the [facility]  clinical practice setting.  About 140,000 participants have been enrolled at 150 mobile clinics around [place].  The trial will focus on evaluating an unprecedented number of people who do not have cancer symptoms and  who represent diverse socio-economic and ethnic populations. The three year trial (8899-0708) will explore the expected benefits of detecting new cancers at an early stage when treatment is generally more successful. By screening participants annually over three years, we will be able to explore how the Galleri test could be used alongside existing NHS screening programs.       I spent a total of 42 minutes on the day of the visit. Please see the note for further information on patient assessment and treatment.     Mary Burger, KEESHA, APRN, AGCNS-BC  Clinical Nurse Specialist  Cancer Risk Management Program  [facility] [place]    Cc:  Fabienne Monge MD

## 2024-05-14 ENCOUNTER — PATIENT OUTREACH (OUTPATIENT)
Dept: ONCOLOGY | Facility: CLINIC | Age: 64
End: 2024-05-14

## 2024-05-14 ENCOUNTER — LAB (OUTPATIENT)
Dept: LAB | Facility: CLINIC | Age: 64
End: 2024-05-14
Payer: COMMERCIAL

## 2024-05-14 DIAGNOSIS — Z12.5 SCREENING FOR PROSTATE CANCER: ICD-10-CM

## 2024-05-14 DIAGNOSIS — Z85.820 HISTORY OF MELANOMA: ICD-10-CM

## 2024-05-14 DIAGNOSIS — Z80.52 FAMILY HISTORY OF BLADDER CANCER: ICD-10-CM

## 2024-05-14 DIAGNOSIS — Z80.3 FAMILY HISTORY OF MALIGNANT NEOPLASM OF BREAST: ICD-10-CM

## 2024-05-14 DIAGNOSIS — Z80.0 FAMILY HISTORY OF PANCREATIC CANCER: ICD-10-CM

## 2024-05-14 PROCEDURE — 36415 COLL VENOUS BLD VENIPUNCTURE: CPT | Performed by: PATHOLOGY

## 2024-05-14 PROCEDURE — 86849 IMMUNOLOGY PROCEDURE: CPT

## 2024-05-14 NOTE — PROGRESS NOTES
Writer received referral to Cancer Risk Management/Genetic Counseling.    Referred for:   Cindy wants to revisit her genetic testing results to see if there are any additional recommendations. PLease schedule as a return visit with Cinthia Sarmiento.   This qualifies for NEW visit as it has been > 3 years since last conversation with genetic counseling.    Cinthia has agreed to see in an open return spot.    Referred By    Provider Department Location Phone   Mary Burger APRN CNP [facility] [phone]       Referral reviewed for appropriate plan, and sent to New Patient Scheduling ([phone]) for completion.    Karyn Rice, RN, BSN  Oncology New Patient Nurse Navigator   [facility]

## 2024-05-24 ENCOUNTER — TELEPHONE (OUTPATIENT)
Dept: ONCOLOGY | Facility: CLINIC | Age: 64
End: 2024-05-24
Payer: COMMERCIAL

## 2024-05-24 LAB — SCANNED LAB RESULT: NORMAL

## 2024-06-03 ENCOUNTER — VIRTUAL VISIT (OUTPATIENT)
Dept: ONCOLOGY | Facility: CLINIC | Age: 64
End: 2024-06-03
Payer: COMMERCIAL

## 2024-06-03 DIAGNOSIS — Z80.52 FAMILY HISTORY OF BLADDER CANCER: ICD-10-CM

## 2024-06-03 DIAGNOSIS — Z80.3 FAMILY HISTORY OF MALIGNANT NEOPLASM OF BREAST: ICD-10-CM

## 2024-06-03 DIAGNOSIS — Z80.0 FAMILY HISTORY OF PANCREATIC CANCER: ICD-10-CM

## 2024-06-03 DIAGNOSIS — Z80.42 FAMILY HISTORY OF PROSTATE CANCER: ICD-10-CM

## 2024-06-03 DIAGNOSIS — Z85.820 HISTORY OF MELANOMA: Primary | ICD-10-CM

## 2024-06-03 PROCEDURE — 96040 HC GENETIC COUNSELING, EACH 30 MINUTES: CPT | Mod: GT,95 | Performed by: GENETIC COUNSELOR, MS

## 2024-06-03 NOTE — PROGRESS NOTES
"6/3/2024    Virtual Visit Details  Type of service:  Video Visit   Originating Location (pt. Location): Home  Distant Location (provider location):  Off-site  Platform used for Video Visit: Lilli  Length of video visit: 41 minutes    Referring Provider: YSABEL Collins CNP    Presenting Information:   Today Cindy elected for a virtual genetic counseling visit through the Cancer Risk Management Program to discuss her personal and family history of cancer. We reviewed this history, cancer screening recommendations, and updated genetic testing options.    Of note, Cindy previously met with Carmen Vazquez MS, [facility] on 12/16/2015 and pursued testing using the 32 gene CancerNext panel offered by [facility]. This testing identified APC variant of uncertain significance p.B0823W (also known as c.7717A>G) and MUTYH variant of uncertain significance p.V234M (also known as c.700G>A). No harmful mutations were identified in the other genes included in the test: TODD, BARD1, BMPR1A, BRCA1, BRCA2, BRIP1, CDH1, CDK4, CDKN2A, CHEK2, MLH1, MRE11A, MSH2, MSH6, NBN, NF1, PALB2, PMS2, POLD1, POLE, PTEN, RAD50, RAD51C, RAD51D, SMAD4, SMARCA4, STK11, and TP53.   We then spoke on 12/28/2017 regarding the APC variant, which was reclassified as a \"variant, likely benign\".   We then spoke again on 2/25/2021, as the MUTYH variant was reclassified as a \"variant, likely pathogenic\". Please see telephone notes for additional details.    Updated Personal History:  Cindy is a 64 year old female. She was diagnosed with a malignant melanoma in her right lower leg at age 47; treatment included a resection. She now meets with a dermatologist every 12 months.    She had her first menstrual period at age 12, her first child at age 31, and went through menopause around age 52. Cindy has her ovaries, fallopian tubes and uterus in place, and she has had no ovarian cancer screening to date. She reports that she has never used hormone " replacement therapy.      She has annual mammograms and her most recent mammogram in January 2024 was normal. Her most recent colonoscopy in May 2021 identified two 6mm sessile serrated adenomas; follow-up was recommended in five years. Her prior colonoscopy in January 2018 removed one 10mm sessile serrated adenoma. She follows with Dr. Varela for pancreatic cancer screening and her most recent MRCP in June 2023 was normal. She pursued the Galleri test in May 2024 which was normal (no cancer signal detected). She does not regularly do any other cancer screening at this time.    Updated Family History: (Please see scanned pedigree for detailed family history information)  Cindy's 32 year old son pursued genetic testing and reportedly carries a single MUTYH gene mutation.  One brother is 67 and has never had a cancer, but has had numerous colon polyps removed.  One brother is 61 and was diagnosed with prostate cancer at age 56.  One sister was diagnosed with pancreatic cancer at age 54 and passed away at age 59.  One sister is 57 and was diagnosed with DCIS at age 48; she may have pursued genetic testing, but the results are unknown.  Cindy's mother was diagnosed with breast cancer at age 76 and passed away at age 79.  One maternal aunt was diagnosed with ovarian cancer at age 86 and passed away at age 90.  Her son was diagnosed with pancreatic cancer at age 70.  Cindy's maternal grandmother was diagnosed with pancreatic cancer at age 69 and passed away at age 70.  Cindy's maternal grandfather was diagnosed with lung cancer at age 70 and passed away at age 82.  Cindy's father was diagnosed with bladder cancer at age 65 and passed away at age 92.  Cindy's paternal uncle was diagnosed with a melanoma  at age 80, followed by kidney cancer in his late 80's, and passed away in his late 80's.  Cindy's paternal grandmother was diagnosed with esophageal cancer at age 96 and passed away at age 99.  Her maternal  "ethnicity is Scandinavian. Her paternal ethnicity is [place] and English. There is no known Ashkenazi Sabianist ancestry on either side of her family.    Discussion:  We reviewed the features of sporadic, familial, and hereditary cancers. In looking at Cindy's family history, it is possible that a cancer susceptibility gene is present as Cindy and several relatives on each side of her family have been diagnosed with related cancers in several generations; Cindy and several of her relatives were also diagnosed under age 50 and/or with multiple primary cancers.  We first discussed her prior genetic testing results, which identified the single MUTYH \"likely pathogenic\" mutation but no other mutations in the 32 genes included in her test.  We discussed that this testing would still be considered comprehensive for inherited causes of several of the cancers in her family, specifically breast and gynecologic cancers.  As such, additional genetic testing is unlikely to identify a single explanation for Cindy's family history of breast and ovarian cancer. Cindy verbalized understanding.  We then discussed that testing is now available that includes genes associated with increased risk for other cancers in her family, such as melanoma and colon cancer/polyps.  For example, we discussed that mutations in the BAP1 gene are associated with increased risk for both melanoma and kidney cancer. Also, mutations in several genes (I.e. AXIN2, MSH3, NTHL1) are associated with increased risk for colon polyps and mutations in other genes (I.e. TSC1, TSC2, VHL) are associated with increased risk for pancreatic neuroendocrine tumors.  We discussed that there are additional genes that could cause increased risk for hereditary cancer and/or colon polyps. As many of these genes present with overlapping features in a family and accurate cancer risk cannot always be established based upon the pedigree analysis alone, it would be reasonable " for Cindy to consider panel genetic testing to analyze multiple genes at once.   Cindy shared that she would be interested in gathering as much information as possible from genetic testing. As such, we discussed the [facility] Multi-Cancer Panel.  The [facility] Multi-Cancer Panel analyzes 70 genes associated with increased risk for cancer: AIP, ALK, APC, TODD, AXIN2, BAP1, BARD1, BLM, BMPR1A, BRCA1, BRCA2, BRIP1, CDC73, CDH1, CDK4, CDKN1B, CDKN2A, CHEK2, CTNNA1, DICER1, EGFR, EPCAM, FH, FLCN, GREM1, HOXB13, KIT, LZTR1, MAX, MBD4, MEN1, MET, MITF, MLH1, MSH2, MSH3, MSH6, MUTYH, NF1, NF2, NTHL1, PALB2, PDGFRA, PMS2, POLD1, POLE, POT1, EDCUD6D, PTCH1, PTEN, RAD51C, RAD51D, RB1, RET, SDHA, SDHAF2, SDHB, SDHC, SDHD, SMAD4, SMARCA4, SMARCB1, SMARCE1, STK11, SUFU, DIIK385, TP53, TSC1, TSC2, VHL.  Consent was obtained over the video and Cindy elected to schedule a lab appointment at her earliest convenience. We also discussed that insurance coverage is unlikely for this testing and [facility] offers a reduced self-pay billing option; Cindy felt comfortable with this self-pay billing option. Once her blood is drawn, it will be sent to [facility] for testing. Turn around time: 2-3 weeks after [facility] receives her blood sample.  A detailed handout regarding hereditary cancer and the information we discussed was provided to Cindy at the end of our appointment today and can be found in the after visit summary. Topics included: inheritance pattern, cancer risks, cancer screening recommendations, and also risks, benefits and limitations of testing.  Medical Management: For Cindy, we reviewed that the information from genetic testing may determine:  additional cancer screening for which Cindy may qualify (i.e. mammogram and breast MRI, more frequent colonoscopies, more frequent dermatologic exams, etc.),  options for risk reducing surgeries Cindy could consider (i.e. bilateral mastectomy, surgery to remove her ovaries and/or  uterus, etc.),    and targeted chemotherapies if she were to develop certain cancers in the future (i.e. immunotherapy for individuals with Cuadra syndrome, PARP inhibitors, etc.).   These recommendations and possible targeted chemotherapies will be discussed in detail once genetic testing is completed.     Plan:  1) Today Cindy elected to proceed with genetic testing using the [facility] Multi-Cancer Panel. She will schedule a lab appointment at her earliest convenience.  2) The results should be available 2-3 weeks after Jar[facility] receives her blood sample.  3) Cindy will be contacted to schedule a virtual return visit with me to discuss the results.    Cinthia Sarmiento MS, [facility] [place]  Licensed, Certified Genetic Counselor  Office: [phone]  [email or web address]

## 2024-06-03 NOTE — LETTER
"    6/3/2024         RE: Cindy Wilson  [address] [address]        Dear Colleague,    Thank you for referring your patient, Cindy Wilson, to the [facility]. Please see a copy of my visit note below.    6/3/2024    Virtual Visit Details  Type of service:  Video Visit   Originating Location (pt. Location): Home  Distant Location (provider location):  Off-site  Platform used for Video Visit: Well  Length of video visit: 41 minutes    Referring Provider: YSABEL Collins CNP    Presenting Information:   Today Cindy elected for a virtual genetic counseling visit through the Cancer Risk Management Program to discuss her personal and family history of cancer. We reviewed this history, cancer screening recommendations, and updated genetic testing options.    Of note, Cindy previously met with Carmen Vazquez MS, [facility] on 12/16/2015 and pursued testing using the 32 gene CancerNext panel offered by [facility]. This testing identified APC variant of uncertain significance p.N1941Q (also known as c.7717A>G) and MUTYH variant of uncertain significance p.V234M (also known as c.700G>A). No harmful mutations were identified in the other genes included in the test: TODD, BARD1, BMPR1A, BRCA1, BRCA2, BRIP1, CDH1, CDK4, CDKN2A, CHEK2, MLH1, MRE11A, MSH2, MSH6, NBN, NF1, PALB2, PMS2, POLD1, POLE, PTEN, RAD50, RAD51C, RAD51D, SMAD4, SMARCA4, STK11, and TP53.   We then spoke on 12/28/2017 regarding the APC variant, which was reclassified as a \"variant, likely benign\".   We then spoke again on 2/25/2021, as the MUTYH variant was reclassified as a \"variant, likely pathogenic\". Please see telephone notes for additional details.    Updated Personal History:  Cindy is a 64 year old female. She was diagnosed with a malignant melanoma in her right lower leg at age 47; treatment included a resection. She now meets with a dermatologist every 12 months.    She had her first menstrual period " at age 12, her first child at age 31, and went through menopause around age 52. Cindy has her ovaries, fallopian tubes and uterus in place, and she has had no ovarian cancer screening to date. She reports that she has never used hormone replacement therapy.      She has annual mammograms and her most recent mammogram in January 2024 was normal. Her most recent colonoscopy in May 2021 identified two 6mm sessile serrated adenomas; follow-up was recommended in five years. Her prior colonoscopy in January 2018 removed one 10mm sessile serrated adenoma. She follows with Dr. Varela for pancreatic cancer screening and her most recent MRCP in June 2023 was normal. She pursued the Galleri test in May 2024 which was normal (no cancer signal detected). She does not regularly do any other cancer screening at this time.    Updated Family History: (Please see scanned pedigree for detailed family history information)  Cindy's 32 year old son pursued genetic testing and reportedly carries a single MUTYH gene mutation.  One brother is 67 and has never had a cancer, but has had numerous colon polyps removed.  One brother is 61 and was diagnosed with prostate cancer at age 56.  One sister was diagnosed with pancreatic cancer at age 54 and passed away at age 59.  One sister is 57 and was diagnosed with DCIS at age 48; she may have pursued genetic testing, but the results are unknown.  Cindy's mother was diagnosed with breast cancer at age 76 and passed away at age 79.  One maternal aunt was diagnosed with ovarian cancer at age 86 and passed away at age 90.  Her son was diagnosed with pancreatic cancer at age 70.  Cindy's maternal grandmother was diagnosed with pancreatic cancer at age 69 and passed away at age 70.  Cindy's maternal grandfather was diagnosed with lung cancer at age 70 and passed away at age 82.  Cindy's father was diagnosed with bladder cancer at age 65 and passed away at age 92.  Cindy's paternal uncle was  "diagnosed with a melanoma  at age 80, followed by kidney cancer in his late 80's, and passed away in his late 80's.  Cindy's paternal grandmother was diagnosed with esophageal cancer at age 96 and passed away at age 99.  Her maternal ethnicity is Scandinavian. Her paternal ethnicity is [place] and English. There is no known Ashkenazi Temple ancestry on either side of her family.    Discussion:  We reviewed the features of sporadic, familial, and hereditary cancers. In looking at Cindy's family history, it is possible that a cancer susceptibility gene is present as Cindy and several relatives on each side of her family have been diagnosed with related cancers in several generations; Cindy and several of her relatives were also diagnosed under age 50 and/or with multiple primary cancers.  We first discussed her prior genetic testing results, which identified the single MUTYH \"likely pathogenic\" mutation but no other mutations in the 32 genes included in her test.  We discussed that this testing would still be considered comprehensive for inherited causes of several of the cancers in her family, specifically breast and gynecologic cancers.  As such, additional genetic testing is unlikely to identify a single explanation for Cindy's family history of breast and ovarian cancer. Cindy verbalized understanding.  We then discussed that testing is now available that includes genes associated with increased risk for other cancers in her family, such as melanoma and colon cancer/polyps.  For example, we discussed that mutations in the BAP1 gene are associated with increased risk for both melanoma and kidney cancer. Also, mutations in several genes (I.e. AXIN2, MSH3, NTHL1) are associated with increased risk for colon polyps and mutations in other genes (I.e. TSC1, TSC2, VHL) are associated with increased risk for pancreatic neuroendocrine tumors.  We discussed that there are additional genes that could cause increased " risk for hereditary cancer and/or colon polyps. As many of these genes present with overlapping features in a family and accurate cancer risk cannot always be established based upon the pedigree analysis alone, it would be reasonable for Cindy to consider panel genetic testing to analyze multiple genes at once.   Cindy shared that she would be interested in gathering as much information as possible from genetic testing. As such, we discussed the [facility] Multi-Cancer Panel.  The [facility] Multi-Cancer Panel analyzes 70 genes associated with increased risk for cancer: AIP, ALK, APC, TODD, AXIN2, BAP1, BARD1, BLM, BMPR1A, BRCA1, BRCA2, BRIP1, CDC73, CDH1, CDK4, CDKN1B, CDKN2A, CHEK2, CTNNA1, DICER1, EGFR, EPCAM, FH, FLCN, GREM1, HOXB13, KIT, LZTR1, MAX, MBD4, MEN1, MET, MITF, MLH1, MSH2, MSH3, MSH6, MUTYH, NF1, NF2, NTHL1, PALB2, PDGFRA, PMS2, POLD1, POLE, POT1, OPWAG4L, PTCH1, PTEN, RAD51C, RAD51D, RB1, RET, SDHA, SDHAF2, SDHB, SDHC, SDHD, SMAD4, SMARCA4, SMARCB1, SMARCE1, STK11, SUFU, BQNN389, TP53, TSC1, TSC2, VHL.  Consent was obtained over the video and Cindy elected to schedule a lab appointment at her earliest convenience. We also discussed that insurance coverage is unlikely for this testing and [facility] offers a reduced self-pay billing option; Cindy felt comfortable with this self-pay billing option. Once her blood is drawn, it will be sent to [facility] for testing. Turn around [facility]  A detailed handout regarding hereditary cancer and the information we discussed was provided to Cindy at the end of our appointment today and can be found in the after visit summary. Topics included: inheritance pattern, cancer risks, cancer screening recommendations, and also risks, benefits and limitations of testing.  Medical Management: For Cindy, we reviewed that the information from genetic testing may determine:  additional cancer screening for which Cindy may  qualify (i.e. mammogram and breast MRI, more frequent colonoscopies, more frequent dermatologic exams, etc.),  options for risk reducing surgeries Cindy could consider (i.e. bilateral mastectomy, surgery to remove her ovaries and/or uterus, etc.),    and targeted chemotherapies if she were to develop certain cancers in the future (i.e. immunotherapy for individuals with Cuadra syndrome, PARP inhibitors, etc.).   These recommendations and possible targeted chemotherapies will be discussed in detail once genetic testing is completed.     Plan:  1) Today Cindy elected to proceed with genetic testing using the [facility] [employer] Multi-Cancer Panel. She will schedule a lab appointment at her earliest convenience.  2) The results should be available 2-3 weeks after Jarrett receives her blood sample.  3) Cindy will be contacted to schedule a virtual return visit with me to discuss the results.    Cinthia Sarmiento MS, [facility]  Licensed, Certified Genetic Counselor  Office: [phone]  [email or web address]

## 2024-06-03 NOTE — NURSING NOTE
Is the patient currently in the state of [place]? YES    Visit mode:VIDEO    If the visit is dropped, the patient can be reconnected by: VIDEO VISIT: Send to e-mail at: [email or web address]    Will anyone else be joining the visit? NO  (If patient encounters technical issues they should call [phone]    How would you like to obtain your AVS? MyChart    Are changes needed to the allergy or medication list? N/A    Are refills needed on medications prescribed by this physician? NO    Reason for visit: Consult    Brianna EDWARDS

## 2024-06-04 NOTE — PATIENT INSTRUCTIONS
Assessing Cancer Risk  Only about 5-10% of cancers are thought to be due to an inherited cancer susceptibility gene.    These families often have:  Several people with the same or related types of cancer  Cancers diagnosed at a young age (before age 50)  Individuals with more than one primary cancer  Multiple generations of the family affected with cancer    Genetic Testing  Genetic testing involves a blood or saliva test and will look at the genetic information in select genes for any harmful mutations that are associated with increased cancer risk.  If possible, it is recommended that the person(s) who has had cancer be tested before other family members.  That person will give us the most useful information about whether or not a specific gene is associated with the cancer in the family.     Results  There are three possible results of genetic testing:  Positive--a harmful mutation was identified  Negative--no mutation was identified  Variant of unknown significance--a variation in one of the genes was identified, but it is unclear how this impacts cancer risk in the family    Advantages and Disadvantages  There are advantages and disadvantages to genetic testing.    Advantages  May clarify your cancer risk  Can help you make medical decisions  May explain the cancers in your family  May give useful information to your family members (if you share your results)    Disadvantages  Possible negative emotional impact of learning about inherited cancer risk  Uncertainty in interpreting a negative test result in some situations  Possible genetic discrimination concerns (see below)    Inheritance   Mutations in most cancer risk genes are inherited in an autosomal dominant pattern.  This means that if a parent has a mutation, each of their children will have a 50% chance of inheriting that same mutation.  Therefore, each child would have a 50% chance of being at increased risk for developing cancer.                                               Image obtained from Genetics Home Reference, 2013     Genetic Information Nondiscrimination Act (BERNARDINO)  BERNARDINO is a federal law that protects individuals from health insurance or employment discrimination based on a genetic test result alone.  Although rare, there are currently no legal protections in terms of life insurance, long term care, or disability insurances.  Visit the National Human Genome Research [place] at Genome.gov/46516426 to learn more.    Reducing Cancer Risk  If a harmful mutation is found in a cancer risk gene, there may be certain screening tests or preventative surgeries that can be offered. This information will be discussed after genetic testing is completed. If no mutations are found on genetic testing, screening is then recommended based on personal and/or family history of cancer.     Questions to Think About Regarding Genetic Testing  What effect will the test result have on me and my relationship with my family members if I have an inherited gene mutation?  If I don t have a gene mutation?  Should I share my test results, and how will my family react to this news, which may also affect them?  Are my children ready to learn new information that may one day affect their own health?    Resources  American Cancer Society (ACS) cancer.org   National Cancer [place] (NCI) cancer.gov     Please call us if you have any questions or concerns.   Cancer Risk Management Program [phone] ([phone])  Sam Thompson, MS [facility] [phone]  Amee Harrison, MS, [facility] [phone]  Ekaterina Jaeger, MS, [facility]  [phone]  Sarina Jones, MS, [facility]  [phone]  Jennifer Gary, MS, [facility]  [phone]  Cinthia Sarmiento, MS, [facility] [phone]  Sepideh Kitchen, MS, [facility] [phone]

## 2024-06-27 ENCOUNTER — ANCILLARY PROCEDURE (OUTPATIENT)
Dept: MRI IMAGING | Facility: CLINIC | Age: 64
End: 2024-06-27
Attending: INTERNAL MEDICINE
Payer: COMMERCIAL

## 2024-06-27 DIAGNOSIS — Z80.0 FAMILY HISTORY OF PANCREATIC CANCER: ICD-10-CM

## 2024-06-27 PROCEDURE — 74183 MRI ABD W/O CNTR FLWD CNTR: CPT | Performed by: RADIOLOGY

## 2024-06-27 PROCEDURE — A9585 GADOBUTROL INJECTION: HCPCS | Performed by: RADIOLOGY

## 2024-06-27 RX ORDER — GADOBUTROL 604.72 MG/ML
7.5 INJECTION INTRAVENOUS ONCE
Status: COMPLETED | OUTPATIENT
Start: 2024-06-27 | End: 2024-06-27

## 2024-06-27 RX ADMIN — GADOBUTROL 6 ML: 604.72 INJECTION INTRAVENOUS at 12:41

## 2024-06-27 NOTE — DISCHARGE INSTRUCTIONS
MRI Contrast Discharge Instructions    The IV contrast you received today will pass out of your body in your  urine. This will happen in the next 24 hours. You will not feel this process.  Your urine will not change color.    Drink at least 4 extra glasses of water or juice today (unless your doctor  has restricted your fluids). This reduces the stress on your kidneys.  You may take your regular medicines.    If you are on dialysis: It is best to have dialysis today.    If you have a reaction: Most reactions happen right away. If you have  any new symptoms after leaving the hospital (such as hives or swelling),  call your hospital at the correct number below. Or call your family doctor.  If you have breathing distress or wheezing, call 911.    Special instructions: ***    I have read and understand the above information.    Signature:______________________________________ Date:___________    Staff:__________________________________________ Date:___________     Time:__________    [place] [facility] Departments:    ___[place]: [phone]  ___[facility]: [phone]  ___[facility]: [phone] ___[facility]: [phone]  ___[facility]: [phone]  ___[facility]: [phone]  ___[phone]  ___[facility]: [phone]  ___[phone]

## 2024-06-27 NOTE — LETTER
Patient:  Cindy Wilson  :   1960  MRN:     [id number]        Ms.Laurie QUITA Wilson  [address] [facility] [address]        2024    Dear ,    We are writing to inform you of your test results. In short, great news. Your MRI/MRCP was without any concerning finding. Specifically, your pancreas was normal. As discussed previously, our plan includes yearly surveillance of your pancreas by repeat contrasted MRI/MRCP.     I have included the formal documtentation of the results below. It continues to be a pleasure participating in your care.  Please feel free to contact our clinic with any further questions.      Sincerely,    Miguel A Varela MD PhD FACCHAPARRO DAY  Professor of Medicine, Surgery and Pediatrics  Interventional and Therapeutic Endoscopy  Chief, Division of Gastroenterology and Hepatology and Nutrition  GI Service     [facility] [address] [place], [place] [address]    New Consultations  [phone]  Procedure Scheduling [phone]  Clinical Nurse Coordinator [phone]  Clinical Fax   [phone]  Administrative   [phone]  Administrative Fax  [phone]        Resulted Orders   MRI Abdomen w & w/o contrast mrcp    Narrative    Exam: MR ABDOMEN MRCP W/O & W CONTRAST, 2024 2:01 PM    Indication: Family history of pancreatic cancer    Comparison: 2023 MRI    Technique: Images were acquired with and without intravenous  gadolinium contrast through the upper abdomen. The following MR images  were acquired without intravenous contrast: TrueFISP, multiplanar  T2-weighted, axial T1 in/out of phase, T2-weighted MRCP images, axial  diffusion-weighted and axial apparent diffusion coefficient.  T1-weighted images were obtained before contrast at the multiple time  points following contrast injection. 3-D reformatted images were  generated by the technologist. Contrast dose: 6.0mL  Gadavist    FINDINGS:    Gallbladder/Biliary Tree: Gallbladder is normal. No intra or  extrahepatic biliary dilatation.    Pancreas: Normal T1 hyperintensity of the pancreatic parenchyma is  maintained. No ductal dilatation or focal pancreatic mass.    Liver: No evidence of cirrhosis, iron deposition or steatosis. No  focal hepatic mass. 2.7 cm hemangioma in segment 8/5.    Spleen: normal.    Kidneys: No hydronephrosis or focal mass. Small parapelvic cysts in  the left kidney.    Adrenal glands: Normal.    Bowel: Visualized small and large bowel are normal in caliber.    Lymph nodes: No lymphadenopathy.    Blood vessels: No aortic aneurysm.    Lung bases: Unremarkable.    Bones and soft tissues: No suspicious or acute osseus lesion.    Mesentery: No ascites.      Impression    IMPRESSION:  1.  Stable hepatic hemangioma.  2.  No pancreatic or other abdominal mass.    MADAY SMART MD         SYSTEM ID:  [id number]

## 2024-07-02 DIAGNOSIS — Z80.0 FAMILY HISTORY OF PANCREATIC CANCER: Primary | ICD-10-CM

## 2024-07-23 ENCOUNTER — LAB (OUTPATIENT)
Dept: LAB | Facility: CLINIC | Age: 64
End: 2024-07-23
Payer: COMMERCIAL

## 2024-07-23 DIAGNOSIS — Z80.52 FAMILY HISTORY OF BLADDER CANCER: ICD-10-CM

## 2024-07-23 DIAGNOSIS — Z80.0 FAMILY HISTORY OF PANCREATIC CANCER: ICD-10-CM

## 2024-07-23 DIAGNOSIS — Z80.3 FAMILY HISTORY OF MALIGNANT NEOPLASM OF BREAST: ICD-10-CM

## 2024-07-23 DIAGNOSIS — Z80.42 FAMILY HISTORY OF PROSTATE CANCER: ICD-10-CM

## 2024-07-23 DIAGNOSIS — Z85.820 HISTORY OF MELANOMA: ICD-10-CM

## 2024-07-23 PROCEDURE — 36415 COLL VENOUS BLD VENIPUNCTURE: CPT

## 2024-07-23 PROCEDURE — 99000 SPECIMEN HANDLING OFFICE-LAB: CPT

## 2024-07-30 DIAGNOSIS — Z80.3 FAMILY HISTORY OF MALIGNANT NEOPLASM OF BREAST: ICD-10-CM

## 2024-07-30 DIAGNOSIS — Z80.0 FAMILY HISTORY OF PANCREATIC CANCER: ICD-10-CM

## 2024-07-30 DIAGNOSIS — Z85.820 HISTORY OF MELANOMA: Primary | ICD-10-CM

## 2024-07-30 DIAGNOSIS — Z80.42 FAMILY HISTORY OF PROSTATE CANCER: ICD-10-CM

## 2024-07-30 DIAGNOSIS — Z80.52 FAMILY HISTORY OF BLADDER CANCER: ICD-10-CM

## 2024-07-31 LAB — SCANNED LAB RESULT: ABNORMAL

## 2024-08-07 ENCOUNTER — VIRTUAL VISIT (OUTPATIENT)
Dept: ONCOLOGY | Facility: CLINIC | Age: 64
End: 2024-08-07
Attending: GENETIC COUNSELOR, MS
Payer: COMMERCIAL

## 2024-08-07 DIAGNOSIS — Z85.820 HISTORY OF MELANOMA: ICD-10-CM

## 2024-08-07 DIAGNOSIS — Z80.3 FAMILY HISTORY OF MALIGNANT NEOPLASM OF BREAST: ICD-10-CM

## 2024-08-07 DIAGNOSIS — Z80.0 FAMILY HISTORY OF PANCREATIC CANCER: ICD-10-CM

## 2024-08-07 DIAGNOSIS — Z80.52 FAMILY HISTORY OF BLADDER CANCER: ICD-10-CM

## 2024-08-07 DIAGNOSIS — Z15.89 MONOALLELIC MUTATION OF MUTYH GENE: Primary | ICD-10-CM

## 2024-08-07 DIAGNOSIS — Z80.42 FAMILY HISTORY OF PROSTATE CANCER: ICD-10-CM

## 2024-08-07 PROCEDURE — 96040 HC GENETIC COUNSELING, EACH 30 MINUTES: CPT | Mod: GT,95 | Performed by: GENETIC COUNSELOR, MS

## 2024-08-07 NOTE — NURSING NOTE
Current patient location: [address] [place] [id number]      Is the patient currently in the state of [place]? YES    Visit mode:VIDEO    If the visit is dropped, the patient can be reconnected by: VIDEO VISIT: Send to e-mail at: [email or web address]    Will anyone else be joining the visit? NO  (If patient encounters technical issues they should call [phone]    How would you like to obtain your AVS? MyChart    Are changes needed to the allergy or medication list? N/A    Reason for visit: Gerry EDWARDS

## 2024-08-07 NOTE — LETTER
8/7/2024      Cindy Wilson  [address] [facility] [address]      Dear Colleague,    Thank you for referring your patient, Cindy Wilson, to the [facility]. Please see a copy of my visit note below.    8/7/2024    Virtual Visit Details  Type of service:  Video Visit   Originating Location (pt. Location): Home  Distant Location (provider location):  Off-site  Platform used for Video Visit: [facility]  Length of video visit: 21 minutes    Referring Provider: YSABEL Collins CNP     Presenting Information:   I spoke to Cindy by video today to discuss her genetic testing results. We last met on 6/3/2024 and her blood was drawn on 7/23/2024. The [facility] Mult-Cancer Panel was ordered from [facility]. This testing was done because of Cindy's personal and family history of cancer.    Genetic Testing Result: POSITIVE - CARRIER  Cindy is POSITIVE for one MUTYH gene mutation. Specifically her mutation is called c.700G>A (also known as p.[id number]). This is the same mutation that was identified by her previous genetic testing. 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 No additional mutations were found in the MUTYH gene.    Genetic Testing Result: Variant of Uncertain Significance (VUS)  Cindy was also found to have a variant of uncertain significance (VUS) in the EGFR gene. 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 No other variants or mutations were found in the EGFR gene. Given the uncertain significance of this result, medical management decisions should NOT be made based on this test result alone.    VUS Interpretation:  We discussed several different interpretations of the inconclusive EGFR test result. It is not clear if this variant in the EGFR gene is associated with increased cancer risk.  1. This variant may be a benign change that does not increase cancer risk.  2. This variant may be a harmful mutation that does increase cancer  "risk.  The laboratory is working to determine if this variant is harmful or benign, and [facility] will contact me if it is reclassified.    Cindy's relatives may also carry this EGFR variant. Because it is unclear what, if any, risk is associated with this variant, clinical genetic testing for this EGFR variant alone is not recommended for relatives.    Of note, Cindy tested negative for variants and mutations in the following genes by sequencing and deletion/duplication analysis (unless otherwise specified in the test report): AIP, ALK, APC, TODD, AXIN2, BAP1, BARD1, BLM, BMPR1A, BRCA1, BRCA2, BRIP1, CDC73, CDH1, CDK4, CDKN1B, CDKN2A, CHEK2, CTNNA1, DICER1, EPCAM, FH, FLCN, GREM1, HOXB13, KIT, LZTR1, MAX, MBD4, MEN1, MET, MITF, MLH1, MSH2, MSH3, MSH6, NF1, NF2, NTHL1, PALB2, PDGFRA, PMS2, POLD1, POLE, POT1, XTVOE8C, PTCH1, PTEN, RAD51C, RAD51D, RB1, RET, SDHA, SDHAF2, SDHB, SDHC, SDHD, SMAD4, SMARCA4, SMARCB1, SMARCE1, STK11, SUFU, BIMT975, TP53, TSC1, TSC2, VHL.  We reviewed the autosomal dominant inheritance of these genes.   Cindy cannot pass on a mutation in any of these genes to her children based on this test result. Mutations in these genes do not skip generations.      A copy of the test report can be found in the Laboratory tab, dated 7/23/2024, and named \"LABORATORY MISCELLANEOUS ORDER\". The report is scanned in as a linked document.    MUTYH Cancer Risks:   We reviewed that having only one MUTYH mutation means that Cindy is a carrier for MUTYH-associated polyposis (MAP). Approximately 1-2% of individuals in the general population carry a single mutation in the MUTYH gene.      Individuals who carry one mutation in the MUTYH gene may be at slightly increased risk for colorectal cancer, however recent large studies have demonstrated no significant increase in risk. These risks may be influenced by family history, though, such as having a first degree relative diagnosed with colon cancer. Other cancer " risks may be present (such as neuroendocrine tumors, gastric, hepatobiliary, endometrial, breast cancer, and colon polyps), however these risks are also not well defined.      We discussed that in comparison, individuals with MAP (who have two MUTYH mutations) may have up to 100 (rarely thousands) of colon polyps. Individuals with MAP have a 70-90% risk of developing colon cancer if not treated, and colonoscopy screening is recommended to begin no later than age 25-30. MAP is also associated with an increased risk of duodenal cancer (4%) and other gastrointestinal polyposis (duodenal and gastric fundic gland polyps).        Cancer Screening and Prevention:  The following screening is recommended for individuals who have one mutation in the MUTYH gene, per current National Comprehensive Cancer Network (NCCN) guidelines:  Given the conflicting evidence regarding colorectal cancer risk and MUTYH carriers, individuals with a single MUTYH mutation and no significant family history of colon cancer should follow general population colonoscopy recommendations.  There are currently no other specific screening recommendations for other cancers potentially associated with one MUTYH mutation.    Other cancer screening based on Cindy's personal and family history:  Cindy should continue to follow all dermatology recommendations, given her prior history of melanoma.  Due to the family history of melanoma, Cindy's children, siblings, and close paternal relatives remain at some increased risk for developing melanoma. According to the American Cancer Society, they are encouraged to speak to their primary care providers about having regular skin exams and safe skin practices (i.e. sunscreen, self skin exams, limited sun exposure, etc.).  As previously discussed, Cindy (and her other siblings) meets current NCCN guidelines for pancreatic cancer screening. As such, Cindy is encouraged to continue following all pancreatic cancer  "screening recommendations as made by her gastroenterologist. If her siblings have not yet discussed pancreatic cancer screening with their medical providers, they are encouraged to do so.  Based on her personal and family history, Cindy has a 10.6% lifetime risk of developing breast cancer based on the MALI 8 model. Therefore, Cindy does not meet current National Comprehensive Cancer Network (NCCN) guidelines for high risk breast screening, which is offered to women with a 20% lifetime risk or higher. However, it is still important for Cindy to continue with routine breast screening under the care of her physicians.   Cindy had several questions regarding alternative breast screening options, specifically breast MRI's, given her personal history of a breast lump this spring (normal by ultrasound) and family history of breast cancer.   We discussed that her lifetime breast cancer risk does not meet the threshold to qualify for breast MRI's, so insurance coverage for that screening is unlikely. That being said, several local radiology groups do offer an \"abbreviated breast MRI\" for individuals that are interested in additional screening and are willing to pay out of pocket.  Given the benefits and limitations of breast MRI's, Cindy is encouraged to discuss this screening in more detail with her primary care provider. They may have individualized recommendations. Cindy verbalized understanding.  Cindy's close female relatives also remain at increased risk for breast cancer given their family history. Breast cancer screening is generally recommended to begin approximately 10 years younger than the earliest age of breast cancer diagnosis in the family, or at age 40, whichever comes first. In this family, screening may begin at age 38. Breast screening options should be discussed with an individual's primary care provider and a Genetic Counselor, to determine at what age to begin screening, what screening is " appropriate, and if additional screening (such as breast MRI) is necessary based on personal/family history factors.  Per current NCCN guidelines, individuals with a first-degree relative with a confirmed advanced adenoma (like Cindy had removed in 2018) should begin colonoscopy at age 40, or at age of onset of the adenoma in their relative, whichever comes first.  In this family, colonoscopy should begin at age 40 and be repeated every 5-10 years, or per colonoscopy findings. This would apply to Cindy's children and siblings. These recommendations may change based on personal and family history as well as personal preference, and should be discussed with an individual's physician.  Other population cancer screening options, such as those recommended by the American Cancer Society and NCCN, are also appropriate for Cindy and her family. These screening recommendations may change if there are changes to Cindy's personal and/or family history of cancer. Final screening recommendations should be made in consultation with each individual's primary care provider.    Of note, the above information is based on our current understanding of Cindy's genetic findings. Cindy is encouraged to reach out to me regularly and with any pertinent updates to her personal and/or family history of cancer, as our understanding of the genetic findings in her family may change over time.     Implications for Family Members:  We reviewed the autosomal recessive inheritance of MAP. Individuals with MAP have a mutation within both copies of the MUTYH gene (two mutations, one mutation typically inherited from each parent). When both biological parents are carriers of a single MUTYH mutation, they have a 25% chance of having a child together who inherited two mutations (affected with MAP), a 50% chance of having a child who inherited one mutation (carrier of MAP), and a 25% chance of having a child who inherited neither mutation  (unaffected; not a carrier). These chances apply to each pregnancy.     We discussed that based on Cindy's result, each of her children have a 50% chance to carry the mutation identified in Cindy. It is possible, though, that they could have also inherited a second MUTYH mutation from their father. As such, testing for MUTYH mutations in Cindy s children is available to determine their risks. They should have full gene sequencing and deletion/duplication analysis of the MUTYH gene to best address the chance they could have inherited the MUTYH identified in Cindy and/or a different mutation from their father. Cindy shared that her oldest son has also pursued testing and is known to carry a single MUTYH mutation. Her youngest son has not yet pursued genetic testing, but has been encouraged to do so.    Genetic counseling is also recommended for Cindy s siblings, as they have at least a 50% chance to carry the same MUTYH mutation identified in Cindy. It will likely be recommended that they also consider comprehensive testing of the MUTYH gene as it is possible that they could have MAP or carry a mutation in the MUTYH gene that is different than the one identified in Cindy. Cindy is also encouraged to share this information with her extended relatives on each side of her family, as it is not currently known from which parent she inherited this mutation. I would be happy to see Cindy s family members for testing.     Additional Testing Considerations:  Even though Cindy's genetic testing result was positive for this single MUTYH mutation, it is not expected to be the explanation for her family history of cancer. As such, other relatives may carry a different gene mutation associated with hereditary cancer. This means Cindy's relatives are encouraged to meet with a genetic counselor to discuss their MUTYH single gene versus multi-gene testing options. If any of her relatives do pursue genetic testing, Cindy  "is encouraged to contact me so that we may review the impact of their test results on her.    Plan:  1. Cindy was provided a copy of her test results via [employer]'s patient portal.  2. Cindy was previously provided a \"Dear Relative\" letter to share with her family members, when the MUTYH variant was reclassified in 2021.  3. She plans to follow up with her medical providers, as needed.  4. Cindy is encouraged to contact me regularly and if there are any changes to her personal/family history of cancer.    Cinthia Sarmiento MS, [facility]  Licensed, Certified Genetic Counselor  Office: [phone]  [email or web address]      Again, thank you for allowing me to participate in the care of your patient.        Sincerely,        Cinthia Sarmiento,   "

## 2024-08-07 NOTE — PROGRESS NOTES
8/7/2024    Virtual Visit Details  Type of service:  Video Visit   Originating Location (pt. Location): Home  Distant Location (provider location):  Off-site  Platform used for Video Visit: Lilli  Length of video visit: 21 minutes    Referring Provider: YSABEL Collins CNP     Presenting Information:   I spoke to Cindy by video today to discuss her genetic testing results. We last met on 6/3/2024 and her blood was drawn on 7/23/2024. The [employer] Mult-Cancer Panel was ordered from [facility]. This testing was done because of Cindy's personal and family history of cancer.    Genetic Testing Result: POSITIVE - CARRIER  Cindy is POSITIVE for one MUTYH gene mutation. Specifically her mutation is called c.700G>A (also known as [id number]). This is the same mutation that was identified by her previous genetic testing. 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 No additional mutations were found in the MUTYH gene.    Genetic Testing Result: Variant of Uncertain Significance (VUS)  Cindy was also found to have a variant of uncertain significance (VUS) in the EGFR gene. 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 No other variants or mutations were found in the EGFR gene. Given the uncertain significance of this result, medical management decisions should NOT be made based on this test result alone.    VUS Interpretation:  We discussed several different interpretations of the inconclusive EGFR test result. It is not clear if this variant in the EGFR gene is associated with increased cancer risk.  1. This variant may be a benign change that does not increase cancer risk.  2. This variant may be a harmful mutation that does increase cancer risk.  The laboratory is working to determine if this variant is harmful or benign, and [facility] will contact me if it is reclassified.    Cindy's relatives may also carry this EGFR variant. Because it is unclear what, if any, risk is  "associated with this variant, clinical genetic testing for this EGFR variant alone is not recommended for relatives.    Of note, Cindy tested negative for variants and mutations in the following genes by sequencing and deletion/duplication analysis (unless otherwise specified in the test report): AIP, ALK, APC, TODD, AXIN2, BAP1, BARD1, BLM, BMPR1A, BRCA1, BRCA2, BRIP1, CDC73, CDH1, CDK4, CDKN1B, CDKN2A, CHEK2, CTNNA1, DICER1, EPCAM, FH, FLCN, GREM1, HOXB13, KIT, LZTR1, MAX, MBD4, MEN1, MET, MITF, MLH1, MSH2, MSH3, MSH6, NF1, NF2, NTHL1, PALB2, PDGFRA, PMS2, POLD1, POLE, POT1, LVCDF5D, PTCH1, PTEN, RAD51C, RAD51D, RB1, RET, SDHA, SDHAF2, SDHB, SDHC, SDHD, SMAD4, SMARCA4, SMARCB1, SMARCE1, STK11, SUFU, MZLF022, TP53, TSC1, TSC2, VHL.  We reviewed the autosomal dominant inheritance of these genes.   Cindy cannot pass on a mutation in any of these genes to her children based on this test result. Mutations in these genes do not skip generations.      A copy of the test report can be found in the Laboratory tab, dated 7/23/2024, and named \"LABORATORY MISCELLANEOUS ORDER\". The report is scanned in as a linked document.    MUTYH Cancer Risks:   We reviewed that having only one MUTYH mutation means that Cindy is a carrier for MUTYH-associated polyposis (MAP). Approximately 1-2% of individuals in the general population carry a single mutation in the MUTYH gene.      Individuals who carry one mutation in the MUTYH gene may be at slightly increased risk for colorectal cancer, however recent large studies have demonstrated no significant increase in risk. These risks may be influenced by family history, though, such as having a first degree relative diagnosed with colon cancer. Other cancer risks may be present (such as neuroendocrine tumors, gastric, hepatobiliary, endometrial, breast cancer, and colon polyps), however these risks are also not well defined.      We discussed that in comparison, individuals with MAP (who have " two MUTYH mutations) may have up to 100 (rarely thousands) of colon polyps. Individuals with MAP have a 70-90% risk of developing colon cancer if not treated, and colonoscopy screening is recommended to begin no later than age 25-30. MAP is also associated with an increased risk of duodenal cancer (4%) and other gastrointestinal polyposis (duodenal and gastric fundic gland polyps).        Cancer Screening and Prevention:  The following screening is recommended for individuals who have one mutation in the MUTYH gene, per current National Comprehensive Cancer Network (NCCN) guidelines:  Given the conflicting evidence regarding colorectal cancer risk and MUTYH carriers, individuals with a single MUTYH mutation and no significant family history of colon cancer should follow general population colonoscopy recommendations.  There are currently no other specific screening recommendations for other cancers potentially associated with one MUTYH mutation.    Other cancer screening based on Cindy's personal and family history:  Cindy should continue to follow all dermatology recommendations, given her prior history of melanoma.  Due to the family history of melanoma, Cindy's children, siblings, and close paternal relatives remain at some increased risk for developing melanoma. According to the American Cancer Society, they are encouraged to speak to their primary care providers about having regular skin exams and safe skin practices (i.e. sunscreen, self skin exams, limited sun exposure, etc.).  As previously discussed, Cindy (and her other siblings) meets current NCCN guidelines for pancreatic cancer screening. As such, Cindy is encouraged to continue following all pancreatic cancer screening recommendations as made by her gastroenterologist. If her siblings have not yet discussed pancreatic cancer screening with their medical providers, they are encouraged to do so.  Based on her personal and family history, Cindy has  "a 10.6% lifetime risk of developing breast cancer based on the MALI 8 model. Therefore, Cindy does not meet current National Comprehensive Cancer Network (NCCN) guidelines for high risk breast screening, which is offered to women with a 20% lifetime risk or higher. However, it is still important for Cindy to continue with routine breast screening under the care of her physicians.   Cindy had several questions regarding alternative breast screening options, specifically breast MRI's, given her personal history of a breast lump this spring (normal by ultrasound) and family history of breast cancer.   We discussed that her lifetime breast cancer risk does not meet the threshold to qualify for breast MRI's, so insurance coverage for that screening is unlikely. That being said, several local radiology groups do offer an \"abbreviated breast MRI\" for individuals that are interested in additional screening and are willing to pay out of pocket.  Given the benefits and limitations of breast MRI's, Cindy is encouraged to discuss this screening in more detail with her primary care provider. They may have individualized recommendations. Cindy verbalized understanding.  Cindy's close female relatives also remain at increased risk for breast cancer given their family history. Breast cancer screening is generally recommended to begin approximately 10 years younger than the earliest age of breast cancer diagnosis in the family, or at age 40, whichever comes first. In this family, screening may begin at age 38. Breast screening options should be discussed with an individual's primary care provider and a Genetic Counselor, to determine at what age to begin screening, what screening is appropriate, and if additional screening (such as breast MRI) is necessary based on personal/family history factors.  Per current NCCN guidelines, individuals with a first-degree relative with a confirmed advanced adenoma (like Cindy had removed " in 2018) should begin colonoscopy at age 40, or at age of onset of the adenoma in their relative, whichever comes first.  In this family, colonoscopy should begin at age 40 and be repeated every 5-10 years, or per colonoscopy findings. This would apply to Cindy's children and siblings. These recommendations may change based on personal and family history as well as personal preference, and should be discussed with an individual's physician.  Other population cancer screening options, such as those recommended by the American Cancer Society and NCCN, are also appropriate for Cindy and her family. These screening recommendations may change if there are changes to Cindy's personal and/or family history of cancer. Final screening recommendations should be made in consultation with each individual's primary care provider.    Of note, the above information is based on our current understanding of Cindy's genetic findings. Cindy is encouraged to reach out to me regularly and with any pertinent updates to her personal and/or family history of cancer, as our understanding of the genetic findings in her family may change over time.     Implications for Family Members:  We reviewed the autosomal recessive inheritance of MAP. Individuals with MAP have a mutation within both copies of the MUTYH gene (two mutations, one mutation typically inherited from each parent). When both biological parents are carriers of a single MUTYH mutation, they have a 25% chance of having a child together who inherited two mutations (affected with MAP), a 50% chance of having a child who inherited one mutation (carrier of MAP), and a 25% chance of having a child who inherited neither mutation (unaffected; not a carrier). These chances apply to each pregnancy.     We discussed that based on Cindy's result, each of her children have a 50% chance to carry the mutation identified in Cindy. It is possible, though, that they could have also  "inherited a second MUTYH mutation from their father. As such, testing for MUTYH mutations in Cindy s children is available to determine their risks. They should have full gene sequencing and deletion/duplication analysis of the MUTYH gene to best address the chance they could have inherited the MUTYH identified in Cindy and/or a different mutation from their father. Cindy shared that her oldest son has also pursued testing and is known to carry a single MUTYH mutation. Her youngest son has not yet pursued genetic testing, but has been encouraged to do so.    Genetic counseling is also recommended for Cindy s siblings, as they have at least a 50% chance to carry the same MUTYH mutation identified in Cindy. It will likely be recommended that they also consider comprehensive testing of the MUTYH gene as it is possible that they could have MAP or carry a mutation in the MUTYH gene that is different than the one identified in Cindy. Cindy is also encouraged to share this information with her extended relatives on each side of her family, as it is not currently known from which parent she inherited this mutation. I would be happy to see Cindy s family members for testing.     Additional Testing Considerations:  Even though Cindy's genetic testing result was positive for this single MUTYH mutation, it is not expected to be the explanation for her family history of cancer. As such, other relatives may carry a different gene mutation associated with hereditary cancer. This means Cindy's relatives are encouraged to meet with a genetic counselor to discuss their MUTYH single gene versus multi-gene testing options. If any of her relatives do pursue genetic testing, Cindy is encouraged to contact me so that we may review the impact of their test results on her.    Plan:  1. Cindy was provided a copy of her test results via [facility]'s patient portal.  2. Cindy was previously provided a \"Dear Relative\" letter to share " with her family members, when the MUTYH variant was reclassified in 2021.  3. She plans to follow up with her medical providers, as needed.  4. Cindy is encouraged to contact me regularly and if there are any changes to her personal/family history of cancer.    Cinthia Sarmiento MS, [facility] [place]  Licensed, Certified Genetic Counselor  Office: [phone]  [email or web address]

## 2025-06-01 ENCOUNTER — HEALTH MAINTENANCE LETTER (OUTPATIENT)
Age: 65
End: 2025-06-01

## 2025-06-23 ENCOUNTER — ANCILLARY PROCEDURE (OUTPATIENT)
Dept: MRI IMAGING | Facility: CLINIC | Age: 65
End: 2025-06-23
Attending: INTERNAL MEDICINE
Payer: COMMERCIAL

## 2025-06-23 DIAGNOSIS — Z80.0 FAMILY HISTORY OF PANCREATIC CANCER: ICD-10-CM

## 2025-06-23 PROCEDURE — 74183 MRI ABD W/O CNTR FLWD CNTR: CPT | Performed by: RADIOLOGY

## 2025-06-23 PROCEDURE — A9585 GADOBUTROL INJECTION: HCPCS | Performed by: RADIOLOGY

## 2025-06-23 RX ORDER — GADOBUTROL 604.72 MG/ML
7.5 INJECTION INTRAVENOUS ONCE
Status: COMPLETED | OUTPATIENT
Start: 2025-06-23 | End: 2025-06-23

## 2025-06-23 RX ADMIN — GADOBUTROL 5.5 ML: 604.72 INJECTION INTRAVENOUS at 15:51

## 2025-06-23 NOTE — DISCHARGE INSTRUCTIONS
MRI Contrast Discharge Instructions    The IV contrast you received today will pass out of your body in your  urine. This will happen in the next 24 hours. You will not feel this process.  Your urine will not change color.    Drink at least 4 extra glasses of water or juice today (unless your doctor  has restricted your fluids). This reduces the stress on your kidneys.  You may take your regular medicines.    If you are on dialysis: It is best to have dialysis today.    If you have a reaction: Most reactions happen right away. If you have  any new symptoms after leaving the hospital (such as hives or swelling),  call your hospital at the correct number below. Or call your family doctor.  If you have breathing distress or wheezing, call 911.    Special instructions: ***    I have read and understand the above information.    Signature:______________________________________ Date:___________    Staff:__________________________________________ Date:___________     Time:__________    [place] [facility] Departments:    ___[place]: [phone]  ___[facility]: [phone]  ___[place]: [phone] ___[facility]: [phone]  ___[facility]: [phone]  ___[facility]: [phone]  ___[phone]  ___[facility]: [phone]  ___[phone]

## 2025-06-25 ENCOUNTER — RESULTS FOLLOW-UP (OUTPATIENT)
Dept: SURGERY | Facility: CLINIC | Age: 65
End: 2025-06-25
Payer: COMMERCIAL

## 2025-06-25 DIAGNOSIS — Z80.0 FAMILY HISTORY OF PANCREATIC CANCER: Primary | ICD-10-CM
